# Patient Record
Sex: MALE | Race: WHITE | Employment: OTHER | URBAN - METROPOLITAN AREA
[De-identification: names, ages, dates, MRNs, and addresses within clinical notes are randomized per-mention and may not be internally consistent; named-entity substitution may affect disease eponyms.]

---

## 2017-01-01 ENCOUNTER — OFFICE VISIT (OUTPATIENT)
Dept: HEMATOLOGY/ONCOLOGY | Age: 77
End: 2017-01-01
Attending: INTERNAL MEDICINE
Payer: MEDICARE

## 2017-01-01 ENCOUNTER — APPOINTMENT (OUTPATIENT)
Dept: CT IMAGING | Facility: HOSPITAL | Age: 77
DRG: 478 | End: 2017-01-01
Attending: INTERNAL MEDICINE
Payer: MEDICARE

## 2017-01-01 ENCOUNTER — TELEPHONE (OUTPATIENT)
Dept: HEMATOLOGY/ONCOLOGY | Facility: HOSPITAL | Age: 77
End: 2017-01-01

## 2017-01-01 ENCOUNTER — TELEPHONE (OUTPATIENT)
Dept: FAMILY MEDICINE CLINIC | Facility: CLINIC | Age: 77
End: 2017-01-01

## 2017-01-01 ENCOUNTER — APPOINTMENT (OUTPATIENT)
Dept: HEMATOLOGY/ONCOLOGY | Age: 77
End: 2017-01-01
Attending: INTERNAL MEDICINE
Payer: MEDICARE

## 2017-01-01 ENCOUNTER — HOSPITAL ENCOUNTER (OUTPATIENT)
Dept: RADIATION ONCOLOGY | Age: 77
Discharge: HOME | End: 2017-01-01
Attending: RADIOLOGY
Payer: MEDICARE

## 2017-01-01 ENCOUNTER — LABORATORY ENCOUNTER (OUTPATIENT)
Dept: LAB | Age: 77
End: 2017-01-01
Attending: FAMILY MEDICINE
Payer: MEDICARE

## 2017-01-01 ENCOUNTER — APPOINTMENT (OUTPATIENT)
Dept: GENERAL RADIOLOGY | Facility: HOSPITAL | Age: 77
DRG: 641 | End: 2017-01-01
Attending: EMERGENCY MEDICINE
Payer: MEDICARE

## 2017-01-01 ENCOUNTER — PATIENT OUTREACH (OUTPATIENT)
Dept: CASE MANAGEMENT | Age: 77
End: 2017-01-01

## 2017-01-01 ENCOUNTER — SNF/IP PROF CHARGE ONLY (OUTPATIENT)
Dept: HEMATOLOGY/ONCOLOGY | Facility: HOSPITAL | Age: 77
End: 2017-01-01

## 2017-01-01 ENCOUNTER — TELEPHONE (OUTPATIENT)
Dept: RADIATION ONCOLOGY | Age: 77
End: 2017-01-01

## 2017-01-01 ENCOUNTER — OFFICE VISIT (OUTPATIENT)
Dept: FAMILY MEDICINE CLINIC | Facility: CLINIC | Age: 77
End: 2017-01-01

## 2017-01-01 ENCOUNTER — ANESTHESIA EVENT (OUTPATIENT)
Dept: ENDOSCOPY | Facility: HOSPITAL | Age: 77
End: 2017-01-01

## 2017-01-01 ENCOUNTER — APPOINTMENT (OUTPATIENT)
Dept: LAB | Facility: HOSPITAL | Age: 77
End: 2017-01-01
Attending: INTERNAL MEDICINE
Payer: MEDICARE

## 2017-01-01 ENCOUNTER — ANESTHESIA (OUTPATIENT)
Dept: PERIOP | Facility: HOSPITAL | Age: 77
End: 2017-01-01

## 2017-01-01 ENCOUNTER — HOSPITAL ENCOUNTER (OUTPATIENT)
Dept: MRI IMAGING | Facility: HOSPITAL | Age: 77
Discharge: HOME | End: 2017-01-01
Attending: INTERNAL MEDICINE
Payer: MEDICARE

## 2017-01-01 ENCOUNTER — HOSPITAL ENCOUNTER (OUTPATIENT)
Dept: NUCLEAR MEDICINE | Facility: HOSPITAL | Age: 77
Discharge: HOME | End: 2017-01-01
Attending: INTERNAL MEDICINE
Payer: MEDICARE

## 2017-01-01 ENCOUNTER — HOSPITAL ENCOUNTER (INPATIENT)
Facility: HOSPITAL | Age: 77
LOS: 3 days | Discharge: HOME | DRG: 641 | End: 2017-01-01
Attending: EMERGENCY MEDICINE | Admitting: INTERNAL MEDICINE
Payer: MEDICARE

## 2017-01-01 ENCOUNTER — NURSE ONLY (OUTPATIENT)
Dept: HEMATOLOGY/ONCOLOGY | Age: 77
End: 2017-01-01
Attending: INTERNAL MEDICINE
Payer: MEDICARE

## 2017-01-01 ENCOUNTER — APPOINTMENT (OUTPATIENT)
Dept: RADIATION ONCOLOGY | Facility: HOSPITAL | Age: 77
End: 2017-01-01
Attending: RADIOLOGY
Payer: MEDICARE

## 2017-01-01 ENCOUNTER — NURSE ONLY (OUTPATIENT)
Dept: RADIATION ONCOLOGY | Facility: HOSPITAL | Age: 77
End: 2017-01-01

## 2017-01-01 ENCOUNTER — SOCIAL WORK SERVICES (OUTPATIENT)
Dept: HEMATOLOGY/ONCOLOGY | Facility: HOSPITAL | Age: 77
End: 2017-01-01

## 2017-01-01 ENCOUNTER — APPOINTMENT (OUTPATIENT)
Dept: LAB | Age: 77
End: 2017-01-01
Attending: NURSE PRACTITIONER
Payer: MEDICARE

## 2017-01-01 ENCOUNTER — SURGERY (OUTPATIENT)
Age: 77
End: 2017-01-01

## 2017-01-01 ENCOUNTER — MEDICAL CORRESPONDENCE (OUTPATIENT)
Dept: RADIATION ONCOLOGY | Age: 77
End: 2017-01-01

## 2017-01-01 ENCOUNTER — APPOINTMENT (OUTPATIENT)
Dept: MRI IMAGING | Facility: HOSPITAL | Age: 77
DRG: 478 | End: 2017-01-01
Attending: INTERNAL MEDICINE
Payer: MEDICARE

## 2017-01-01 ENCOUNTER — HOSPITAL ENCOUNTER (EMERGENCY)
Facility: HOSPITAL | Age: 77
Discharge: HOME | End: 2017-01-01
Attending: EMERGENCY MEDICINE
Payer: MEDICARE

## 2017-01-01 ENCOUNTER — ANESTHESIA EVENT (OUTPATIENT)
Dept: PERIOP | Facility: HOSPITAL | Age: 77
End: 2017-01-01

## 2017-01-01 ENCOUNTER — APPOINTMENT (OUTPATIENT)
Dept: CT IMAGING | Facility: HOSPITAL | Age: 77
DRG: 478 | End: 2017-01-01
Attending: EMERGENCY MEDICINE
Payer: MEDICARE

## 2017-01-01 ENCOUNTER — APPOINTMENT (OUTPATIENT)
Dept: MRI IMAGING | Facility: HOSPITAL | Age: 77
End: 2017-01-01
Attending: INTERNAL MEDICINE
Payer: MEDICARE

## 2017-01-01 ENCOUNTER — APPOINTMENT (OUTPATIENT)
Dept: HEMATOLOGY/ONCOLOGY | Age: 77
End: 2017-01-01
Attending: NURSE PRACTITIONER
Payer: MEDICARE

## 2017-01-01 ENCOUNTER — APPOINTMENT (OUTPATIENT)
Dept: HEMATOLOGY/ONCOLOGY | Age: 77
DRG: 054 | End: 2017-01-01
Attending: INTERNAL MEDICINE
Payer: MEDICARE

## 2017-01-01 ENCOUNTER — HOSPITAL ENCOUNTER (INPATIENT)
Facility: HOSPITAL | Age: 77
LOS: 3 days | Discharge: HOME | DRG: 054 | End: 2017-01-01
Attending: HOSPITALIST | Admitting: HOSPITALIST
Payer: MEDICARE

## 2017-01-01 ENCOUNTER — DIETICIAN VISIT (OUTPATIENT)
Dept: HEMATOLOGY/ONCOLOGY | Facility: HOSPITAL | Age: 77
End: 2017-01-01

## 2017-01-01 ENCOUNTER — HOSPITAL ENCOUNTER (INPATIENT)
Dept: RADIATION ONCOLOGY | Facility: HOSPITAL | Age: 77
Discharge: HOME | DRG: 054 | End: 2017-01-01
Attending: RADIOLOGY | Admitting: HOSPITALIST
Payer: MEDICARE

## 2017-01-01 ENCOUNTER — HOSPITAL ENCOUNTER (INPATIENT)
Facility: HOSPITAL | Age: 77
LOS: 2 days | Discharge: HOME | DRG: 478 | End: 2017-01-01
Attending: EMERGENCY MEDICINE | Admitting: INTERNAL MEDICINE
Payer: MEDICARE

## 2017-01-01 ENCOUNTER — OFFICE VISIT (OUTPATIENT)
Dept: HEMATOLOGY/ONCOLOGY | Facility: HOSPITAL | Age: 77
DRG: 054 | End: 2017-01-01
Attending: INTERNAL MEDICINE
Payer: MEDICARE

## 2017-01-01 ENCOUNTER — ANESTHESIA (OUTPATIENT)
Dept: PERIOP | Facility: HOSPITAL | Age: 77
DRG: 054 | End: 2017-01-01
Payer: MEDICARE

## 2017-01-01 ENCOUNTER — ANESTHESIA (OUTPATIENT)
Dept: ENDOSCOPY | Facility: HOSPITAL | Age: 77
End: 2017-01-01

## 2017-01-01 ENCOUNTER — ANESTHESIA EVENT (OUTPATIENT)
Dept: PERIOP | Facility: HOSPITAL | Age: 77
DRG: 054 | End: 2017-01-01
Payer: MEDICARE

## 2017-01-01 ENCOUNTER — APPOINTMENT (OUTPATIENT)
Dept: HEMATOLOGY/ONCOLOGY | Facility: HOSPITAL | Age: 77
End: 2017-01-01
Attending: INTERNAL MEDICINE
Payer: MEDICARE

## 2017-01-01 ENCOUNTER — LAB ENCOUNTER (OUTPATIENT)
Dept: LAB | Age: 77
End: 2017-01-01
Attending: FAMILY MEDICINE
Payer: MEDICARE

## 2017-01-01 ENCOUNTER — LAB ENCOUNTER (OUTPATIENT)
Dept: LAB | Age: 77
DRG: 641 | End: 2017-01-01
Attending: FAMILY MEDICINE
Payer: MEDICARE

## 2017-01-01 ENCOUNTER — APPOINTMENT (OUTPATIENT)
Dept: MRI IMAGING | Facility: HOSPITAL | Age: 77
DRG: 054 | End: 2017-01-01
Attending: INTERNAL MEDICINE
Payer: MEDICARE

## 2017-01-01 ENCOUNTER — APPOINTMENT (OUTPATIENT)
Dept: CT IMAGING | Facility: HOSPITAL | Age: 77
DRG: 054 | End: 2017-01-01
Attending: HOSPITALIST
Payer: MEDICARE

## 2017-01-01 VITALS
BODY MASS INDEX: 30 KG/M2 | DIASTOLIC BLOOD PRESSURE: 73 MMHG | SYSTOLIC BLOOD PRESSURE: 153 MMHG | HEART RATE: 63 BPM | TEMPERATURE: 98 F | RESPIRATION RATE: 18 BRPM | RESPIRATION RATE: 20 BRPM | HEIGHT: 68.5 IN | WEIGHT: 198.63 LBS | WEIGHT: 197.31 LBS | BODY MASS INDEX: 29.76 KG/M2 | OXYGEN SATURATION: 97 % | DIASTOLIC BLOOD PRESSURE: 43 MMHG | TEMPERATURE: 98 F | SYSTOLIC BLOOD PRESSURE: 120 MMHG | HEART RATE: 60 BPM | OXYGEN SATURATION: 96 %

## 2017-01-01 VITALS
HEART RATE: 69 BPM | WEIGHT: 203.25 LBS | HEIGHT: 68 IN | DIASTOLIC BLOOD PRESSURE: 64 MMHG | OXYGEN SATURATION: 94 % | TEMPERATURE: 99 F | SYSTOLIC BLOOD PRESSURE: 110 MMHG | BODY MASS INDEX: 30.8 KG/M2

## 2017-01-01 VITALS
HEART RATE: 69 BPM | RESPIRATION RATE: 20 BRPM | BODY MASS INDEX: 30 KG/M2 | TEMPERATURE: 98 F | OXYGEN SATURATION: 98 % | WEIGHT: 198.38 LBS | SYSTOLIC BLOOD PRESSURE: 139 MMHG | DIASTOLIC BLOOD PRESSURE: 66 MMHG

## 2017-01-01 VITALS
DIASTOLIC BLOOD PRESSURE: 69 MMHG | TEMPERATURE: 98 F | HEART RATE: 68 BPM | OXYGEN SATURATION: 98 % | RESPIRATION RATE: 18 BRPM | SYSTOLIC BLOOD PRESSURE: 127 MMHG

## 2017-01-01 VITALS
HEART RATE: 79 BPM | OXYGEN SATURATION: 98 % | BODY MASS INDEX: 28.64 KG/M2 | SYSTOLIC BLOOD PRESSURE: 120 MMHG | DIASTOLIC BLOOD PRESSURE: 68 MMHG | RESPIRATION RATE: 18 BRPM | HEIGHT: 68.5 IN | TEMPERATURE: 96 F | WEIGHT: 191.19 LBS

## 2017-01-01 VITALS
DIASTOLIC BLOOD PRESSURE: 72 MMHG | HEIGHT: 68 IN | OXYGEN SATURATION: 96 % | WEIGHT: 198.81 LBS | TEMPERATURE: 98 F | RESPIRATION RATE: 20 BRPM | BODY MASS INDEX: 30.13 KG/M2 | SYSTOLIC BLOOD PRESSURE: 180 MMHG | HEART RATE: 61 BPM

## 2017-01-01 VITALS
HEART RATE: 58 BPM | RESPIRATION RATE: 18 BRPM | TEMPERATURE: 97 F | BODY MASS INDEX: 28 KG/M2 | WEIGHT: 183.69 LBS | OXYGEN SATURATION: 99 % | SYSTOLIC BLOOD PRESSURE: 142 MMHG | DIASTOLIC BLOOD PRESSURE: 64 MMHG

## 2017-01-01 VITALS
BODY MASS INDEX: 28 KG/M2 | SYSTOLIC BLOOD PRESSURE: 149 MMHG | HEART RATE: 58 BPM | DIASTOLIC BLOOD PRESSURE: 74 MMHG | TEMPERATURE: 98 F | RESPIRATION RATE: 20 BRPM | OXYGEN SATURATION: 98 % | WEIGHT: 184.63 LBS

## 2017-01-01 VITALS
DIASTOLIC BLOOD PRESSURE: 64 MMHG | SYSTOLIC BLOOD PRESSURE: 130 MMHG | HEIGHT: 68.5 IN | BODY MASS INDEX: 30.28 KG/M2 | WEIGHT: 202.13 LBS | TEMPERATURE: 98 F | HEART RATE: 91 BPM | OXYGEN SATURATION: 97 %

## 2017-01-01 VITALS
OXYGEN SATURATION: 98 % | SYSTOLIC BLOOD PRESSURE: 146 MMHG | HEART RATE: 90 BPM | WEIGHT: 171 LBS | BODY MASS INDEX: 26 KG/M2 | DIASTOLIC BLOOD PRESSURE: 87 MMHG | RESPIRATION RATE: 18 BRPM | TEMPERATURE: 97 F

## 2017-01-01 VITALS
RESPIRATION RATE: 20 BRPM | HEART RATE: 72 BPM | DIASTOLIC BLOOD PRESSURE: 64 MMHG | WEIGHT: 184.38 LBS | RESPIRATION RATE: 18 BRPM | OXYGEN SATURATION: 98 % | HEIGHT: 69 IN | OXYGEN SATURATION: 97 % | BODY MASS INDEX: 27.45 KG/M2 | BODY MASS INDEX: 28 KG/M2 | WEIGHT: 185.31 LBS | SYSTOLIC BLOOD PRESSURE: 139 MMHG | DIASTOLIC BLOOD PRESSURE: 74 MMHG | TEMPERATURE: 98 F | TEMPERATURE: 98 F | HEART RATE: 65 BPM | SYSTOLIC BLOOD PRESSURE: 155 MMHG

## 2017-01-01 VITALS
RESPIRATION RATE: 16 BRPM | BODY MASS INDEX: 29.12 KG/M2 | OXYGEN SATURATION: 98 % | SYSTOLIC BLOOD PRESSURE: 110 MMHG | WEIGHT: 195.25 LBS | RESPIRATION RATE: 18 BRPM | DIASTOLIC BLOOD PRESSURE: 72 MMHG | HEIGHT: 68.5 IN | SYSTOLIC BLOOD PRESSURE: 150 MMHG | HEART RATE: 72 BPM | DIASTOLIC BLOOD PRESSURE: 60 MMHG | WEIGHT: 194.38 LBS | BODY MASS INDEX: 30 KG/M2 | TEMPERATURE: 97 F | HEART RATE: 68 BPM | TEMPERATURE: 99 F

## 2017-01-01 VITALS
BODY MASS INDEX: 28 KG/M2 | WEIGHT: 182.63 LBS | TEMPERATURE: 97 F | HEART RATE: 57 BPM | DIASTOLIC BLOOD PRESSURE: 67 MMHG | SYSTOLIC BLOOD PRESSURE: 165 MMHG | RESPIRATION RATE: 18 BRPM | OXYGEN SATURATION: 98 %

## 2017-01-01 VITALS
OXYGEN SATURATION: 98 % | RESPIRATION RATE: 18 BRPM | SYSTOLIC BLOOD PRESSURE: 124 MMHG | BODY MASS INDEX: 30 KG/M2 | WEIGHT: 202 LBS | DIASTOLIC BLOOD PRESSURE: 67 MMHG | TEMPERATURE: 97 F | HEART RATE: 73 BPM

## 2017-01-01 VITALS
TEMPERATURE: 98 F | WEIGHT: 199 LBS | RESPIRATION RATE: 18 BRPM | BODY MASS INDEX: 29.81 KG/M2 | SYSTOLIC BLOOD PRESSURE: 115 MMHG | HEIGHT: 68.5 IN | HEART RATE: 78 BPM | OXYGEN SATURATION: 98 % | DIASTOLIC BLOOD PRESSURE: 65 MMHG

## 2017-01-01 VITALS
OXYGEN SATURATION: 98 % | HEART RATE: 76 BPM | SYSTOLIC BLOOD PRESSURE: 141 MMHG | DIASTOLIC BLOOD PRESSURE: 65 MMHG | BODY MASS INDEX: 29.99 KG/M2 | WEIGHT: 200.19 LBS | TEMPERATURE: 98 F | HEIGHT: 68.5 IN

## 2017-01-01 VITALS
DIASTOLIC BLOOD PRESSURE: 81 MMHG | TEMPERATURE: 98 F | OXYGEN SATURATION: 97 % | RESPIRATION RATE: 20 BRPM | HEART RATE: 72 BPM | SYSTOLIC BLOOD PRESSURE: 129 MMHG

## 2017-01-01 VITALS
HEART RATE: 82 BPM | TEMPERATURE: 98 F | DIASTOLIC BLOOD PRESSURE: 81 MMHG | RESPIRATION RATE: 18 BRPM | SYSTOLIC BLOOD PRESSURE: 160 MMHG | OXYGEN SATURATION: 98 %

## 2017-01-01 VITALS
WEIGHT: 203 LBS | TEMPERATURE: 98 F | SYSTOLIC BLOOD PRESSURE: 123 MMHG | OXYGEN SATURATION: 97 % | DIASTOLIC BLOOD PRESSURE: 63 MMHG | BODY MASS INDEX: 30 KG/M2 | HEART RATE: 70 BPM | RESPIRATION RATE: 16 BRPM

## 2017-01-01 VITALS
OXYGEN SATURATION: 97 % | DIASTOLIC BLOOD PRESSURE: 85 MMHG | TEMPERATURE: 97 F | HEART RATE: 88 BPM | RESPIRATION RATE: 20 BRPM | SYSTOLIC BLOOD PRESSURE: 136 MMHG

## 2017-01-01 VITALS
BODY MASS INDEX: 26 KG/M2 | TEMPERATURE: 98 F | HEART RATE: 71 BPM | SYSTOLIC BLOOD PRESSURE: 141 MMHG | DIASTOLIC BLOOD PRESSURE: 70 MMHG | OXYGEN SATURATION: 100 % | WEIGHT: 178 LBS | RESPIRATION RATE: 18 BRPM

## 2017-01-01 VITALS
SYSTOLIC BLOOD PRESSURE: 128 MMHG | TEMPERATURE: 97 F | HEART RATE: 69 BPM | OXYGEN SATURATION: 94 % | WEIGHT: 180.81 LBS | BODY MASS INDEX: 28 KG/M2 | RESPIRATION RATE: 18 BRPM | DIASTOLIC BLOOD PRESSURE: 69 MMHG

## 2017-01-01 VITALS
RESPIRATION RATE: 18 BRPM | TEMPERATURE: 98 F | WEIGHT: 195.81 LBS | HEART RATE: 61 BPM | HEIGHT: 69 IN | BODY MASS INDEX: 29 KG/M2 | OXYGEN SATURATION: 96 % | SYSTOLIC BLOOD PRESSURE: 144 MMHG | DIASTOLIC BLOOD PRESSURE: 53 MMHG

## 2017-01-01 VITALS
SYSTOLIC BLOOD PRESSURE: 163 MMHG | RESPIRATION RATE: 18 BRPM | OXYGEN SATURATION: 100 % | TEMPERATURE: 99 F | DIASTOLIC BLOOD PRESSURE: 75 MMHG | HEART RATE: 57 BPM | BODY MASS INDEX: 30 KG/M2 | WEIGHT: 199.81 LBS

## 2017-01-01 VITALS
RESPIRATION RATE: 18 BRPM | TEMPERATURE: 98 F | HEART RATE: 117 BPM | BODY MASS INDEX: 30 KG/M2 | WEIGHT: 199.38 LBS | DIASTOLIC BLOOD PRESSURE: 74 MMHG | SYSTOLIC BLOOD PRESSURE: 128 MMHG | OXYGEN SATURATION: 97 %

## 2017-01-01 VITALS
HEART RATE: 60 BPM | HEIGHT: 68.5 IN | BODY MASS INDEX: 30.02 KG/M2 | DIASTOLIC BLOOD PRESSURE: 72 MMHG | SYSTOLIC BLOOD PRESSURE: 128 MMHG | WEIGHT: 200.38 LBS | TEMPERATURE: 98 F | OXYGEN SATURATION: 97 % | RESPIRATION RATE: 16 BRPM

## 2017-01-01 VITALS
SYSTOLIC BLOOD PRESSURE: 148 MMHG | BODY MASS INDEX: 29 KG/M2 | DIASTOLIC BLOOD PRESSURE: 73 MMHG | OXYGEN SATURATION: 97 % | RESPIRATION RATE: 18 BRPM | HEART RATE: 71 BPM | TEMPERATURE: 98 F | WEIGHT: 194 LBS

## 2017-01-01 VITALS
SYSTOLIC BLOOD PRESSURE: 147 MMHG | DIASTOLIC BLOOD PRESSURE: 69 MMHG | OXYGEN SATURATION: 99 % | HEART RATE: 77 BPM | RESPIRATION RATE: 16 BRPM | TEMPERATURE: 98 F

## 2017-01-01 VITALS
SYSTOLIC BLOOD PRESSURE: 158 MMHG | DIASTOLIC BLOOD PRESSURE: 77 MMHG | WEIGHT: 186.5 LBS | HEART RATE: 54 BPM | BODY MASS INDEX: 28 KG/M2

## 2017-01-01 VITALS
OXYGEN SATURATION: 97 % | BODY MASS INDEX: 30.77 KG/M2 | WEIGHT: 205.38 LBS | RESPIRATION RATE: 18 BRPM | HEART RATE: 67 BPM | TEMPERATURE: 98 F | SYSTOLIC BLOOD PRESSURE: 144 MMHG | DIASTOLIC BLOOD PRESSURE: 69 MMHG | HEIGHT: 68.5 IN

## 2017-01-01 VITALS
BODY MASS INDEX: 26 KG/M2 | SYSTOLIC BLOOD PRESSURE: 117 MMHG | OXYGEN SATURATION: 98 % | RESPIRATION RATE: 18 BRPM | TEMPERATURE: 98 F | DIASTOLIC BLOOD PRESSURE: 69 MMHG | HEART RATE: 79 BPM | WEIGHT: 178 LBS

## 2017-01-01 VITALS
OXYGEN SATURATION: 99 % | TEMPERATURE: 98 F | SYSTOLIC BLOOD PRESSURE: 170 MMHG | RESPIRATION RATE: 18 BRPM | DIASTOLIC BLOOD PRESSURE: 79 MMHG | HEART RATE: 56 BPM

## 2017-01-01 VITALS
DIASTOLIC BLOOD PRESSURE: 68 MMHG | SYSTOLIC BLOOD PRESSURE: 151 MMHG | WEIGHT: 196 LBS | HEART RATE: 76 BPM | RESPIRATION RATE: 18 BRPM | OXYGEN SATURATION: 98 % | TEMPERATURE: 97 F | BODY MASS INDEX: 29 KG/M2

## 2017-01-01 VITALS
BODY MASS INDEX: 29 KG/M2 | OXYGEN SATURATION: 98 % | DIASTOLIC BLOOD PRESSURE: 83 MMHG | WEIGHT: 190.19 LBS | SYSTOLIC BLOOD PRESSURE: 155 MMHG | TEMPERATURE: 98 F | RESPIRATION RATE: 20 BRPM | HEART RATE: 61 BPM

## 2017-01-01 VITALS
OXYGEN SATURATION: 94 % | SYSTOLIC BLOOD PRESSURE: 132 MMHG | BODY MASS INDEX: 30.16 KG/M2 | HEART RATE: 70 BPM | HEIGHT: 68 IN | WEIGHT: 199 LBS | DIASTOLIC BLOOD PRESSURE: 62 MMHG | TEMPERATURE: 98 F

## 2017-01-01 VITALS
BODY MASS INDEX: 29.62 KG/M2 | SYSTOLIC BLOOD PRESSURE: 142 MMHG | RESPIRATION RATE: 16 BRPM | DIASTOLIC BLOOD PRESSURE: 71 MMHG | OXYGEN SATURATION: 96 % | TEMPERATURE: 97 F | HEIGHT: 69 IN | HEART RATE: 76 BPM | WEIGHT: 200 LBS

## 2017-01-01 VITALS
WEIGHT: 202 LBS | DIASTOLIC BLOOD PRESSURE: 70 MMHG | OXYGEN SATURATION: 98 % | TEMPERATURE: 100 F | HEART RATE: 54 BPM | BODY MASS INDEX: 30 KG/M2 | SYSTOLIC BLOOD PRESSURE: 120 MMHG

## 2017-01-01 VITALS
HEIGHT: 68.5 IN | RESPIRATION RATE: 18 BRPM | OXYGEN SATURATION: 92 % | DIASTOLIC BLOOD PRESSURE: 71 MMHG | HEART RATE: 92 BPM | WEIGHT: 190.81 LBS | SYSTOLIC BLOOD PRESSURE: 124 MMHG | BODY MASS INDEX: 28.59 KG/M2 | TEMPERATURE: 99 F

## 2017-01-01 VITALS
TEMPERATURE: 98 F | RESPIRATION RATE: 16 BRPM | DIASTOLIC BLOOD PRESSURE: 81 MMHG | OXYGEN SATURATION: 96 % | HEART RATE: 66 BPM | SYSTOLIC BLOOD PRESSURE: 150 MMHG

## 2017-01-01 VITALS
TEMPERATURE: 97 F | HEART RATE: 89 BPM | WEIGHT: 199.63 LBS | OXYGEN SATURATION: 98 % | RESPIRATION RATE: 18 BRPM | BODY MASS INDEX: 29 KG/M2 | DIASTOLIC BLOOD PRESSURE: 74 MMHG | SYSTOLIC BLOOD PRESSURE: 131 MMHG

## 2017-01-01 VITALS
RESPIRATION RATE: 18 BRPM | OXYGEN SATURATION: 99 % | BODY MASS INDEX: 29 KG/M2 | TEMPERATURE: 96 F | DIASTOLIC BLOOD PRESSURE: 66 MMHG | HEART RATE: 75 BPM | WEIGHT: 191.5 LBS | SYSTOLIC BLOOD PRESSURE: 119 MMHG

## 2017-01-01 VITALS
DIASTOLIC BLOOD PRESSURE: 68 MMHG | BODY MASS INDEX: 29 KG/M2 | TEMPERATURE: 98 F | SYSTOLIC BLOOD PRESSURE: 121 MMHG | WEIGHT: 196.19 LBS | HEART RATE: 70 BPM | RESPIRATION RATE: 24 BRPM | OXYGEN SATURATION: 97 %

## 2017-01-01 VITALS
DIASTOLIC BLOOD PRESSURE: 57 MMHG | HEART RATE: 74 BPM | RESPIRATION RATE: 20 BRPM | OXYGEN SATURATION: 99 % | BODY MASS INDEX: 30 KG/M2 | SYSTOLIC BLOOD PRESSURE: 134 MMHG | TEMPERATURE: 98 F | WEIGHT: 201.63 LBS

## 2017-01-01 VITALS
SYSTOLIC BLOOD PRESSURE: 150 MMHG | BODY MASS INDEX: 26.22 KG/M2 | HEIGHT: 68.5 IN | HEART RATE: 85 BPM | WEIGHT: 175 LBS | TEMPERATURE: 98 F | OXYGEN SATURATION: 99 % | DIASTOLIC BLOOD PRESSURE: 80 MMHG

## 2017-01-01 VITALS
RESPIRATION RATE: 16 BRPM | BODY MASS INDEX: 30 KG/M2 | DIASTOLIC BLOOD PRESSURE: 67 MMHG | WEIGHT: 200.81 LBS | SYSTOLIC BLOOD PRESSURE: 120 MMHG | TEMPERATURE: 97 F | HEART RATE: 71 BPM | OXYGEN SATURATION: 96 %

## 2017-01-01 DIAGNOSIS — J43.9 PULMONARY EMPHYSEMA, UNSPECIFIED EMPHYSEMA TYPE (HCC): ICD-10-CM

## 2017-01-01 DIAGNOSIS — C61 PROSTATE CANCER (HCC): ICD-10-CM

## 2017-01-01 DIAGNOSIS — C79.51 PROSTATE CANCER METASTATIC TO BONE (HCC): Primary | ICD-10-CM

## 2017-01-01 DIAGNOSIS — D63.0 ANEMIA COMPLICATING NEOPLASTIC DISEASE: ICD-10-CM

## 2017-01-01 DIAGNOSIS — C7B.8 SECONDARY NEUROENDOCRINE TUMOR OF BONE(209.73): ICD-10-CM

## 2017-01-01 DIAGNOSIS — C79.51 METASTATIC SMALL CELL CARCINOMA INVOLVING BONE WITH UNKNOWN PRIMARY SITE (HCC): Primary | ICD-10-CM

## 2017-01-01 DIAGNOSIS — C79.51 PROSTATE CANCER METASTATIC TO BONE (HCC): ICD-10-CM

## 2017-01-01 DIAGNOSIS — C61 PROSTATE CANCER METASTATIC TO BONE (HCC): ICD-10-CM

## 2017-01-01 DIAGNOSIS — C61 PROSTATE CANCER (HCC): Primary | ICD-10-CM

## 2017-01-01 DIAGNOSIS — C79.31 BRAIN METASTASES (HCC): Primary | ICD-10-CM

## 2017-01-01 DIAGNOSIS — C80.1 METASTATIC SMALL CELL CARCINOMA INVOLVING BONE WITH UNKNOWN PRIMARY SITE (HCC): ICD-10-CM

## 2017-01-01 DIAGNOSIS — C79.51 SECONDARY CANCER OF BONE (HCC): Primary | ICD-10-CM

## 2017-01-01 DIAGNOSIS — C79.51 METASTATIC SMALL CELL CARCINOMA INVOLVING BONE WITH UNKNOWN PRIMARY SITE (HCC): ICD-10-CM

## 2017-01-01 DIAGNOSIS — I10 BENIGN ESSENTIAL HTN: ICD-10-CM

## 2017-01-01 DIAGNOSIS — R10.2 PELVIC PAIN: ICD-10-CM

## 2017-01-01 DIAGNOSIS — I71.4 ABDOMINAL AORTIC ANEURYSM (AAA) WITHOUT RUPTURE (HCC): ICD-10-CM

## 2017-01-01 DIAGNOSIS — C80.1 METASTATIC SMALL CELL CARCINOMA INVOLVING BONE WITH UNKNOWN PRIMARY SITE (HCC): Primary | ICD-10-CM

## 2017-01-01 DIAGNOSIS — C79.31 BRAIN METASTASES (HCC): ICD-10-CM

## 2017-01-01 DIAGNOSIS — C61 PROSTATE CANCER METASTATIC TO BONE (HCC): Primary | ICD-10-CM

## 2017-01-01 DIAGNOSIS — R31.0 GROSS HEMATURIA: ICD-10-CM

## 2017-01-01 DIAGNOSIS — R07.81 RIB PAIN ON LEFT SIDE: Primary | ICD-10-CM

## 2017-01-01 DIAGNOSIS — N30.40 RADIATION CYSTITIS: Primary | ICD-10-CM

## 2017-01-01 DIAGNOSIS — D64.9 ANEMIA: ICD-10-CM

## 2017-01-01 DIAGNOSIS — R41.0 CONFUSION: ICD-10-CM

## 2017-01-01 DIAGNOSIS — E78.5 HYPERLIPIDEMIA, UNSPECIFIED HYPERLIPIDEMIA TYPE: ICD-10-CM

## 2017-01-01 DIAGNOSIS — C79.51 METASTATIC SMALL CELL CARCINOMA TO BONE (HCC): ICD-10-CM

## 2017-01-01 DIAGNOSIS — N30.40 RADIATION CYSTITIS: ICD-10-CM

## 2017-01-01 DIAGNOSIS — Z13.31 DEPRESSION SCREENING: ICD-10-CM

## 2017-01-01 DIAGNOSIS — C7B.8 SECONDARY NEUROENDOCRINE TUMOR OF BONE(209.73): Primary | ICD-10-CM

## 2017-01-01 DIAGNOSIS — Z71.89 OTHER SPECIFIED COUNSELING: Primary | ICD-10-CM

## 2017-01-01 DIAGNOSIS — R30.0 DYSURIA: ICD-10-CM

## 2017-01-01 DIAGNOSIS — R19.7 DIARRHEA, UNSPECIFIED TYPE: Primary | ICD-10-CM

## 2017-01-01 DIAGNOSIS — M89.8X8 MASS OF SPINE: Primary | ICD-10-CM

## 2017-01-01 DIAGNOSIS — K52.9 CHRONIC DIARRHEA: Primary | ICD-10-CM

## 2017-01-01 DIAGNOSIS — M89.8X8 MASS OF SPINE: ICD-10-CM

## 2017-01-01 DIAGNOSIS — R19.7 DIARRHEA, UNSPECIFIED TYPE: ICD-10-CM

## 2017-01-01 DIAGNOSIS — E87.6 HYPOKALEMIA: Primary | ICD-10-CM

## 2017-01-01 DIAGNOSIS — C80.1 MALIGNANT SMALL CELL CANCER (HCC): ICD-10-CM

## 2017-01-01 DIAGNOSIS — Z85.46 H/O PROSTATE CANCER: ICD-10-CM

## 2017-01-01 DIAGNOSIS — C80.1 MALIGNANT SMALL CELL CANCER (HCC): Primary | ICD-10-CM

## 2017-01-01 DIAGNOSIS — M54.14 RADICULAR PAIN OF THORACIC REGION: ICD-10-CM

## 2017-01-01 DIAGNOSIS — C79.51 SECONDARY CANCER OF BONE (HCC): ICD-10-CM

## 2017-01-01 DIAGNOSIS — J98.01 BRONCHOSPASM, ACUTE: ICD-10-CM

## 2017-01-01 DIAGNOSIS — E87.6 HYPOKALEMIA: ICD-10-CM

## 2017-01-01 DIAGNOSIS — Z85.46 HISTORY OF PROSTATE CANCER: ICD-10-CM

## 2017-01-01 DIAGNOSIS — R53.1 WEAKNESS: ICD-10-CM

## 2017-01-01 DIAGNOSIS — R10.2 PELVIC PAIN: Primary | ICD-10-CM

## 2017-01-01 DIAGNOSIS — I25.10 CAD IN NATIVE ARTERY: ICD-10-CM

## 2017-01-01 DIAGNOSIS — M89.8X9 BONE PAIN: ICD-10-CM

## 2017-01-01 DIAGNOSIS — N13.9 OBSTRUCTIVE UROPATHY: Primary | ICD-10-CM

## 2017-01-01 DIAGNOSIS — E86.0 DEHYDRATION: ICD-10-CM

## 2017-01-01 DIAGNOSIS — J40 BRONCHITIS: Primary | ICD-10-CM

## 2017-01-01 DIAGNOSIS — N41.9 PROSTATITIS, UNSPECIFIED PROSTATITIS TYPE: Primary | ICD-10-CM

## 2017-01-01 DIAGNOSIS — R33.8 ACUTE URINARY RETENTION: ICD-10-CM

## 2017-01-01 DIAGNOSIS — R26.81 UNSTEADINESS: Primary | ICD-10-CM

## 2017-01-01 DIAGNOSIS — Z71.9 ENCOUNTER FOR EDUCATION: ICD-10-CM

## 2017-01-01 DIAGNOSIS — B37.0 THRUSH: ICD-10-CM

## 2017-01-01 DIAGNOSIS — R53.1 WEAKNESS: Primary | ICD-10-CM

## 2017-01-01 DIAGNOSIS — D72.829 LEUKOCYTOSIS, UNSPECIFIED TYPE: ICD-10-CM

## 2017-01-01 DIAGNOSIS — N30.91 HEMORRHAGIC CYSTITIS: ICD-10-CM

## 2017-01-01 DIAGNOSIS — R26.81 UNSTEADINESS: ICD-10-CM

## 2017-01-01 DIAGNOSIS — R30.0 DYSURIA: Primary | ICD-10-CM

## 2017-01-01 DIAGNOSIS — Z00.00 ENCOUNTER FOR ANNUAL HEALTH EXAMINATION: Primary | ICD-10-CM

## 2017-01-01 DIAGNOSIS — Z97.8 FOLEY CATHETER IN PLACE: ICD-10-CM

## 2017-01-01 DIAGNOSIS — Z01.810 PRE-OPERATIVE CARDIOVASCULAR EXAMINATION: Primary | ICD-10-CM

## 2017-01-01 DIAGNOSIS — Z01.810 PRE-OPERATIVE CARDIOVASCULAR EXAMINATION: ICD-10-CM

## 2017-01-01 DIAGNOSIS — N99.114 POSTPROCEDURAL MALE URETHRAL STRICTURE: ICD-10-CM

## 2017-01-01 LAB
ALBUMIN SERPL-MCNC: 2.9 G/DL (ref 3.5–4.8)
ALBUMIN SERPL-MCNC: 2.9 G/DL (ref 3.5–4.8)
ALBUMIN SERPL-MCNC: 3.3 G/DL (ref 3.5–4.8)
ALBUMIN SERPL-MCNC: 3.4 G/DL (ref 3.5–4.8)
ALBUMIN SERPL-MCNC: 3.7 G/DL (ref 3.5–4.8)
ALP LIVER SERPL-CCNC: 105 U/L (ref 45–117)
ALP LIVER SERPL-CCNC: 127 U/L (ref 45–117)
ALP LIVER SERPL-CCNC: 159 U/L (ref 45–117)
ALP LIVER SERPL-CCNC: 179 U/L (ref 45–117)
ALP LIVER SERPL-CCNC: 76 U/L (ref 45–117)
ALP LIVER SERPL-CCNC: 86 U/L (ref 45–117)
ALP LIVER SERPL-CCNC: 96 U/L (ref 45–117)
ALT SERPL-CCNC: 15 U/L (ref 17–63)
ALT SERPL-CCNC: 19 U/L (ref 17–63)
ALT SERPL-CCNC: 21 U/L (ref 17–63)
ALT SERPL-CCNC: 27 U/L (ref 17–63)
ALT SERPL-CCNC: 29 U/L (ref 17–63)
ALT SERPL-CCNC: 36 U/L (ref 17–63)
ALT SERPL-CCNC: 43 U/L (ref 17–63)
APPEARANCE: CLEAR
AST SERPL-CCNC: 13 U/L (ref 15–41)
AST SERPL-CCNC: 14 U/L (ref 15–41)
AST SERPL-CCNC: 18 U/L (ref 15–41)
AST SERPL-CCNC: 21 U/L (ref 15–41)
AST SERPL-CCNC: 22 U/L (ref 15–41)
AST SERPL-CCNC: 26 U/L (ref 15–41)
AST SERPL-CCNC: 29 U/L (ref 15–41)
BAND %: 1 %
BAND %: 10 %
BAND %: 4 %
BASOPHIL % MANUAL: 0 %
BASOPHIL ABSOLUTE MANUAL: 0 X10(3) UL (ref 0–0.1)
BASOPHILS # BLD AUTO: 0.02 X10(3) UL (ref 0–0.1)
BASOPHILS # BLD AUTO: 0.04 X10(3) UL (ref 0–0.1)
BASOPHILS # BLD AUTO: 0.05 X10(3) UL (ref 0–0.1)
BASOPHILS # BLD AUTO: 0.05 X10(3) UL (ref 0–0.1)
BASOPHILS NFR BLD AUTO: 0.2 %
BASOPHILS NFR BLD AUTO: 0.2 %
BASOPHILS NFR BLD AUTO: 0.4 %
BASOPHILS NFR BLD AUTO: 0.5 %
BASOPHILS NFR BLD AUTO: 0.5 %
BASOPHILS NFR BLD AUTO: 0.7 %
BILIRUB SERPL-MCNC: 0.2 MG/DL (ref 0.1–2)
BILIRUB SERPL-MCNC: 0.3 MG/DL (ref 0.1–2)
BILIRUB SERPL-MCNC: 0.4 MG/DL (ref 0.1–2)
BILIRUBIN: NEGATIVE
BLOOD TYPE BARCODE: 5100
BUN BLD-MCNC: 10 MG/DL (ref 8–20)
BUN BLD-MCNC: 12 MG/DL (ref 8–20)
BUN BLD-MCNC: 12 MG/DL (ref 8–20)
BUN BLD-MCNC: 14 MG/DL (ref 8–20)
BUN BLD-MCNC: 18 MG/DL (ref 8–20)
CALCIUM BLD-MCNC: 8.4 MG/DL (ref 8.3–10.3)
CALCIUM BLD-MCNC: 8.8 MG/DL (ref 8.3–10.3)
CALCIUM BLD-MCNC: 9 MG/DL (ref 8.3–10.3)
CALCIUM BLD-MCNC: 9 MG/DL (ref 8.3–10.3)
CALCIUM BLD-MCNC: 9.1 MG/DL (ref 8.3–10.3)
CALCIUM BLD-MCNC: 9.2 MG/DL (ref 8.3–10.3)
CALCIUM BLD-MCNC: 9.3 MG/DL (ref 8.3–10.3)
CHLORIDE: 104 MMOL/L (ref 101–111)
CHLORIDE: 105 MMOL/L (ref 101–111)
CHLORIDE: 106 MMOL/L (ref 101–111)
CHLORIDE: 108 MMOL/L (ref 101–111)
CHLORIDE: 108 MMOL/L (ref 101–111)
CHLORIDE: 109 MMOL/L (ref 101–111)
CHLORIDE: 110 MMOL/L (ref 101–111)
CO2: 27 MMOL/L (ref 22–32)
CO2: 27 MMOL/L (ref 22–32)
CO2: 28 MMOL/L (ref 22–32)
CO2: 29 MMOL/L (ref 22–32)
CO2: 29 MMOL/L (ref 22–32)
CREAT BLD-MCNC: 0.76 MG/DL (ref 0.7–1.3)
CREAT BLD-MCNC: 0.78 MG/DL (ref 0.7–1.3)
CREAT BLD-MCNC: 0.86 MG/DL (ref 0.7–1.3)
CREAT BLD-MCNC: 0.87 MG/DL (ref 0.7–1.3)
CREAT BLD-MCNC: 1.38 MG/DL (ref 0.7–1.3)
EOSINOPHIL # BLD AUTO: 0.06 X10(3) UL (ref 0–0.3)
EOSINOPHIL # BLD AUTO: 0.07 X10(3) UL (ref 0–0.3)
EOSINOPHIL # BLD AUTO: 0.08 X10(3) UL (ref 0–0.3)
EOSINOPHIL # BLD AUTO: 0.13 X10(3) UL (ref 0–0.3)
EOSINOPHIL # BLD AUTO: 0.13 X10(3) UL (ref 0–0.3)
EOSINOPHIL # BLD AUTO: 0.14 X10(3) UL (ref 0–0.3)
EOSINOPHIL % MANUAL: 1 %
EOSINOPHIL ABSOLUTE MANUAL: 0.08 X10(3) UL (ref 0–0.3)
EOSINOPHIL ABSOLUTE MANUAL: 0.17 X10(3) UL (ref 0–0.3)
EOSINOPHIL ABSOLUTE MANUAL: 0.22 X10(3) UL (ref 0–0.3)
EOSINOPHIL NFR BLD AUTO: 0.7 %
EOSINOPHIL NFR BLD AUTO: 0.8 %
EOSINOPHIL NFR BLD AUTO: 1.3 %
EOSINOPHIL NFR BLD AUTO: 1.7 %
EOSINOPHIL NFR BLD AUTO: 1.7 %
EOSINOPHIL NFR BLD AUTO: 1.8 %
ERYTHROCYTE [DISTWIDTH] IN BLOOD BY AUTOMATED COUNT: 13.8 % (ref 11.5–16)
ERYTHROCYTE [DISTWIDTH] IN BLOOD BY AUTOMATED COUNT: 15.2 % (ref 11.5–16)
ERYTHROCYTE [DISTWIDTH] IN BLOOD BY AUTOMATED COUNT: 16.9 % (ref 11.5–16)
ERYTHROCYTE [DISTWIDTH] IN BLOOD BY AUTOMATED COUNT: 17.6 % (ref 11.5–16)
ERYTHROCYTE [DISTWIDTH] IN BLOOD BY AUTOMATED COUNT: 17.6 % (ref 11.5–16)
ERYTHROCYTE [DISTWIDTH] IN BLOOD BY AUTOMATED COUNT: 18.6 % (ref 11.5–16)
ERYTHROCYTE [DISTWIDTH] IN BLOOD BY AUTOMATED COUNT: 18.7 % (ref 11.5–16)
ERYTHROCYTE [DISTWIDTH] IN BLOOD BY AUTOMATED COUNT: 19 % (ref 11.5–16)
ERYTHROCYTE [DISTWIDTH] IN BLOOD BY AUTOMATED COUNT: 19.6 % (ref 11.5–16)
GLUCOSE (URINE DIPSTICK): NEGATIVE MG/DL
GLUCOSE BLD-MCNC: 100 MG/DL (ref 65–99)
GLUCOSE BLD-MCNC: 103 MG/DL (ref 70–99)
GLUCOSE BLD-MCNC: 52 MG/DL (ref 70–99)
GLUCOSE BLD-MCNC: 70 MG/DL (ref 70–99)
GLUCOSE BLD-MCNC: 71 MG/DL (ref 70–99)
GLUCOSE BLD-MCNC: 84 MG/DL (ref 70–99)
GLUCOSE BLD-MCNC: 92 MG/DL (ref 70–99)
GLUCOSE BLD-MCNC: 98 MG/DL (ref 70–99)
HCT VFR BLD AUTO: 26.3 % (ref 37–53)
HCT VFR BLD AUTO: 28.7 % (ref 37–53)
HCT VFR BLD AUTO: 29.2 % (ref 37–53)
HCT VFR BLD AUTO: 29.4 % (ref 37–53)
HCT VFR BLD AUTO: 29.9 % (ref 37–53)
HCT VFR BLD AUTO: 30.2 % (ref 37–53)
HCT VFR BLD AUTO: 32.9 % (ref 37–53)
HCT VFR BLD AUTO: 34.4 % (ref 37–53)
HCT VFR BLD AUTO: 42.2 % (ref 37–53)
HGB BLD-MCNC: 10.1 G/DL (ref 13–17)
HGB BLD-MCNC: 10.9 G/DL (ref 13–17)
HGB BLD-MCNC: 13.1 G/DL (ref 13–17)
HGB BLD-MCNC: 7.9 G/DL (ref 13–17)
HGB BLD-MCNC: 8.8 G/DL (ref 13–17)
HGB BLD-MCNC: 9.1 G/DL (ref 13–17)
HGB BLD-MCNC: 9.4 G/DL (ref 13–17)
HGB BLD-MCNC: 9.4 G/DL (ref 13–17)
HGB BLD-MCNC: 9.5 G/DL (ref 13–17)
IMMATURE GRANULOCYTE COUNT: 0.01 X10(3) UL (ref 0–1)
IMMATURE GRANULOCYTE COUNT: 0.05 X10(3) UL (ref 0–1)
IMMATURE GRANULOCYTE COUNT: 0.09 X10(3) UL (ref 0–1)
IMMATURE GRANULOCYTE COUNT: 0.13 X10(3) UL (ref 0–1)
IMMATURE GRANULOCYTE COUNT: 0.13 X10(3) UL (ref 0–1)
IMMATURE GRANULOCYTE COUNT: 0.21 X10(3) UL (ref 0–1)
IMMATURE GRANULOCYTE RATIO %: 0.2 %
IMMATURE GRANULOCYTE RATIO %: 0.7 %
IMMATURE GRANULOCYTE RATIO %: 1.1 %
IMMATURE GRANULOCYTE RATIO %: 1.4 %
IMMATURE GRANULOCYTE RATIO %: 1.8 %
IMMATURE GRANULOCYTE RATIO %: 2.2 %
KETONES (URINE DIPSTICK): NEGATIVE MG/DL
LARGE PLATELETS: PRESENT
LDH: 156 U/L (ref 84–249)
LDH: 157 U/L (ref 84–249)
LDH: 179 U/L (ref 84–249)
LDH: 210 U/L (ref 84–249)
LDH: 232 U/L (ref 84–249)
LEUKOCYTES: NEGATIVE
LYMPHOCYTE % MANUAL: 14 %
LYMPHOCYTE % MANUAL: 5 %
LYMPHOCYTE % MANUAL: 6 %
LYMPHOCYTE ABSOLUTE MANUAL: 1.01 X10(3) UL (ref 0.9–4)
LYMPHOCYTE ABSOLUTE MANUAL: 1.1 X10(3) UL (ref 0.9–4)
LYMPHOCYTE ABSOLUTE MANUAL: 1.12 X10(3) UL (ref 0.9–4)
LYMPHOCYTES # BLD AUTO: 0.35 X10(3) UL (ref 0.9–4)
LYMPHOCYTES # BLD AUTO: 0.37 X10(3) UL (ref 0.9–4)
LYMPHOCYTES # BLD AUTO: 0.5 X10(3) UL (ref 0.9–4)
LYMPHOCYTES # BLD AUTO: 0.55 X10(3) UL (ref 0.9–4)
LYMPHOCYTES # BLD AUTO: 0.62 X10(3) UL (ref 0.9–4)
LYMPHOCYTES # BLD AUTO: 0.68 X10(3) UL (ref 0.9–4)
LYMPHOCYTES NFR BLD AUTO: 3.8 %
LYMPHOCYTES NFR BLD AUTO: 6.6 %
LYMPHOCYTES NFR BLD AUTO: 6.7 %
LYMPHOCYTES NFR BLD AUTO: 6.8 %
LYMPHOCYTES NFR BLD AUTO: 7.1 %
LYMPHOCYTES NFR BLD AUTO: 8.8 %
M PROTEIN MFR SERPL ELPH: 6.5 G/DL (ref 6.1–8.3)
M PROTEIN MFR SERPL ELPH: 6.7 G/DL (ref 6.1–8.3)
M PROTEIN MFR SERPL ELPH: 6.8 G/DL (ref 6.1–8.3)
M PROTEIN MFR SERPL ELPH: 6.8 G/DL (ref 6.1–8.3)
M PROTEIN MFR SERPL ELPH: 7.5 G/DL (ref 6.1–8.3)
MCH RBC QN AUTO: 29.8 PG (ref 27–33.2)
MCH RBC QN AUTO: 30 PG (ref 27–33.2)
MCH RBC QN AUTO: 30.5 PG (ref 27–33.2)
MCH RBC QN AUTO: 31 PG (ref 27–33.2)
MCH RBC QN AUTO: 32.2 PG (ref 27–33.2)
MCH RBC QN AUTO: 32.7 PG (ref 27–33.2)
MCH RBC QN AUTO: 32.8 PG (ref 27–33.2)
MCHC RBC AUTO-ENTMCNC: 30 G/DL (ref 31–37)
MCHC RBC AUTO-ENTMCNC: 30.1 G/DL (ref 31–37)
MCHC RBC AUTO-ENTMCNC: 30.7 G/DL (ref 31–37)
MCHC RBC AUTO-ENTMCNC: 31 G/DL (ref 31–37)
MCHC RBC AUTO-ENTMCNC: 31.4 G/DL (ref 31–37)
MCHC RBC AUTO-ENTMCNC: 31.5 G/DL (ref 31–37)
MCHC RBC AUTO-ENTMCNC: 31.7 G/DL (ref 31–37)
MCHC RBC AUTO-ENTMCNC: 31.7 G/DL (ref 31–37)
MCHC RBC AUTO-ENTMCNC: 32 G/DL (ref 31–37)
MCV RBC AUTO: 100 FL (ref 80–99)
MCV RBC AUTO: 102.4 FL (ref 80–99)
MCV RBC AUTO: 102.4 FL (ref 80–99)
MCV RBC AUTO: 103.2 FL (ref 80–99)
MCV RBC AUTO: 96.4 FL (ref 80–99)
MCV RBC AUTO: 96.6 FL (ref 80–99)
MCV RBC AUTO: 97.1 FL (ref 80–99)
MCV RBC AUTO: 98.7 FL (ref 80–99)
MCV RBC AUTO: 99.7 FL (ref 80–99)
METAMYELOCYTE %: 1 %
METAMYELOCYTE %: 2 %
METAMYELOCYTE %: 2 %
METAMYELOCYTE ABSOLUTE MANUAL: 0.16 X10(3) UL (ref ?–0.01)
METAMYELOCYTE ABSOLUTE MANUAL: 0.17 X10(3) UL (ref ?–0.01)
METAMYELOCYTE ABSOLUTE MANUAL: 0.44 X10(3) UL (ref ?–0.01)
MONOCYTE % MANUAL: 10 %
MONOCYTE % MANUAL: 6 %
MONOCYTE % MANUAL: 8 %
MONOCYTE ABSOLUTE MANUAL: 0.8 X10(3) UL (ref 0.1–0.6)
MONOCYTE ABSOLUTE MANUAL: 1.32 X10(3) UL (ref 0.1–0.6)
MONOCYTE ABSOLUTE MANUAL: 1.34 X10(3) UL (ref 0.1–0.6)
MONOCYTES # BLD AUTO: 0.41 X10(3) UL (ref 0.1–0.6)
MONOCYTES # BLD AUTO: 0.99 X10(3) UL (ref 0.1–0.6)
MONOCYTES # BLD AUTO: 1.05 X10(3) UL (ref 0.1–0.6)
MONOCYTES # BLD AUTO: 1.06 X10(3) UL (ref 0.1–0.6)
MONOCYTES # BLD AUTO: 1.1 X10(3) UL (ref 0.1–0.6)
MONOCYTES # BLD AUTO: 1.38 X10(3) UL (ref 0.1–0.6)
MONOCYTES NFR BLD AUTO: 11.5 %
MONOCYTES NFR BLD AUTO: 12.6 %
MONOCYTES NFR BLD AUTO: 13.3 %
MONOCYTES NFR BLD AUTO: 15 %
MONOCYTES NFR BLD AUTO: 25.3 %
MONOCYTES NFR BLD AUTO: 4.5 %
MULTISTIX LOT#: NORMAL NUMERIC
MYELOCYTE %: 1 %
MYELOCYTE %: 3 %
MYELOCYTE %: 6 %
MYELOCYTE ABSOLUTE MANUAL: 0.17 X10(3) UL (ref ?–0.01)
MYELOCYTE ABSOLUTE MANUAL: 0.24 X10(3) UL (ref ?–0.01)
MYELOCYTE ABSOLUTE MANUAL: 1.32 X10(3) UL (ref ?–0.01)
NEUTROPHIL ABS PRELIM: 12 X10 (3) UL (ref 1.3–6.7)
NEUTROPHIL ABS PRELIM: 15.72 X10 (3) UL (ref 1.3–6.7)
NEUTROPHIL ABS PRELIM: 3.6 X10 (3) UL (ref 1.3–6.7)
NEUTROPHIL ABS PRELIM: 5.06 X10 (3) UL (ref 1.3–6.7)
NEUTROPHIL ABS PRELIM: 5.74 X10 (3) UL (ref 1.3–6.7)
NEUTROPHIL ABS PRELIM: 5.89 X10 (3) UL (ref 1.3–6.7)
NEUTROPHIL ABS PRELIM: 6.5 X10 (3) UL (ref 1.3–6.7)
NEUTROPHIL ABS PRELIM: 7.42 X10 (3) UL (ref 1.3–6.7)
NEUTROPHIL ABS PRELIM: 8.22 X10 (3) UL (ref 1.3–6.7)
NEUTROPHIL ABSOLUTE MANUAL: 13.94 X10(3) UL (ref 1.3–6.7)
NEUTROPHIL ABSOLUTE MANUAL: 17.16 X10(3) UL (ref 1.3–6.7)
NEUTROPHIL ABSOLUTE MANUAL: 5.6 X10(3) UL (ref 1.3–6.7)
NEUTROPHILS # BLD AUTO: 3.6 X10(3) UL (ref 1.3–6.7)
NEUTROPHILS # BLD AUTO: 5.06 X10(3) UL (ref 1.3–6.7)
NEUTROPHILS # BLD AUTO: 5.74 X10(3) UL (ref 1.3–6.7)
NEUTROPHILS # BLD AUTO: 6.5 X10(3) UL (ref 1.3–6.7)
NEUTROPHILS # BLD AUTO: 7.42 X10(3) UL (ref 1.3–6.7)
NEUTROPHILS # BLD AUTO: 8.22 X10(3) UL (ref 1.3–6.7)
NEUTROPHILS % MANUAL: 69 %
NEUTROPHILS % MANUAL: 73 %
NEUTROPHILS % MANUAL: 74 %
NEUTROPHILS NFR BLD AUTO: 66 %
NEUTROPHILS NFR BLD AUTO: 71.9 %
NEUTROPHILS NFR BLD AUTO: 77.1 %
NEUTROPHILS NFR BLD AUTO: 77.8 %
NEUTROPHILS NFR BLD AUTO: 77.9 %
NEUTROPHILS NFR BLD AUTO: 89.4 %
NITRITE, URINE: NEGATIVE
OCCULT BLOOD: NEGATIVE
PH, URINE: 7 (ref 4.5–8)
PLATELET # BLD AUTO: 103 10(3)UL (ref 150–450)
PLATELET # BLD AUTO: 105 10(3)UL (ref 150–450)
PLATELET # BLD AUTO: 125 10(3)UL (ref 150–450)
PLATELET # BLD AUTO: 192 10(3)UL (ref 150–450)
PLATELET # BLD AUTO: 194 10(3)UL (ref 150–450)
PLATELET # BLD AUTO: 205 10(3)UL (ref 150–450)
PLATELET # BLD AUTO: 220 10(3)UL (ref 150–450)
PLATELET # BLD AUTO: 234 10(3)UL (ref 150–450)
PLATELET # BLD AUTO: 241 10(3)UL (ref 150–450)
PLATELET MORPHOLOGY: NORMAL
POTASSIUM SERPL-SCNC: 3.8 MMOL/L (ref 3.6–5.1)
POTASSIUM SERPL-SCNC: 4 MMOL/L (ref 3.6–5.1)
POTASSIUM SERPL-SCNC: 4.2 MMOL/L (ref 3.6–5.1)
POTASSIUM SERPL-SCNC: 4.2 MMOL/L (ref 3.6–5.1)
POTASSIUM SERPL-SCNC: 4.4 MMOL/L (ref 3.6–5.1)
PROMYELOCYTE %: 2 %
PROMYELOCYTE ABSOLUTE MANUAL: 0.44 X10(3) UL (ref ?–0.01)
PROTEIN (URINE DIPSTICK): NEGATIVE MG/DL
PSA SERPL-MCNC: 0.04 NG/ML (ref 0.01–4)
PSA SERPL-MCNC: 0.04 NG/ML (ref 0.01–4)
PSA SERPL-MCNC: 0.05 NG/ML (ref 0.01–4)
PSA SERPL-MCNC: 0.07 NG/ML (ref 0.01–4)
PSA SERPL-MCNC: 0.15 NG/ML (ref 0.01–4)
PSA SERPL-MCNC: 7.04 NG/ML (ref 0.01–4)
RBC # BLD AUTO: 2.63 X10(6)UL (ref 3.8–5.8)
RBC # BLD AUTO: 2.78 X10(6)UL (ref 3.8–5.8)
RBC # BLD AUTO: 2.87 X10(6)UL (ref 3.8–5.8)
RBC # BLD AUTO: 2.93 X10(6)UL (ref 3.8–5.8)
RBC # BLD AUTO: 2.95 X10(6)UL (ref 3.8–5.8)
RBC # BLD AUTO: 3.03 X10(6)UL (ref 3.8–5.8)
RBC # BLD AUTO: 3.39 X10(6)UL (ref 3.8–5.8)
RBC # BLD AUTO: 3.57 X10(6)UL (ref 3.8–5.8)
RBC # BLD AUTO: 4.37 X10(6)UL (ref 3.8–5.8)
RED CELL DISTRIBUTION WIDTH-SD: 48.8 FL (ref 35.1–46.3)
RED CELL DISTRIBUTION WIDTH-SD: 57.2 FL (ref 35.1–46.3)
RED CELL DISTRIBUTION WIDTH-SD: 61.1 FL (ref 35.1–46.3)
RED CELL DISTRIBUTION WIDTH-SD: 62.3 FL (ref 35.1–46.3)
RED CELL DISTRIBUTION WIDTH-SD: 63.4 FL (ref 35.1–46.3)
RED CELL DISTRIBUTION WIDTH-SD: 65.8 FL (ref 35.1–46.3)
RED CELL DISTRIBUTION WIDTH-SD: 68.9 FL (ref 35.1–46.3)
RED CELL DISTRIBUTION WIDTH-SD: 69.4 FL (ref 35.1–46.3)
RED CELL DISTRIBUTION WIDTH-SD: 70.3 FL (ref 35.1–46.3)
SODIUM SERPL-SCNC: 140 MMOL/L (ref 136–144)
SODIUM SERPL-SCNC: 140 MMOL/L (ref 136–144)
SODIUM SERPL-SCNC: 141 MMOL/L (ref 136–144)
SODIUM SERPL-SCNC: 142 MMOL/L (ref 136–144)
SODIUM SERPL-SCNC: 144 MMOL/L (ref 136–144)
SPECIFIC GRAVITY: 1.02 (ref 1–1.03)
TOTAL CELLS COUNTED: 100
TOXIC GRANULATION: PRESENT
URINE-COLOR: YELLOW
UROBILINOGEN,SEMI-QN: 0.2 MG/DL (ref 0–1.9)
WBC # BLD AUTO: 16.8 X10(3) UL (ref 4–13)
WBC # BLD AUTO: 22 X10(3) UL (ref 4–13)
WBC # BLD AUTO: 5.5 X10(3) UL (ref 4–13)
WBC # BLD AUTO: 7.1 X10(3) UL (ref 4–13)
WBC # BLD AUTO: 7.5 X10(3) UL (ref 4–13)
WBC # BLD AUTO: 8 X10(3) UL (ref 4–13)
WBC # BLD AUTO: 8.4 X10(3) UL (ref 4–13)
WBC # BLD AUTO: 9.2 X10(3) UL (ref 4–13)
WBC # BLD AUTO: 9.5 X10(3) UL (ref 4–13)

## 2017-01-01 PROCEDURE — 96417 CHEMO IV INFUS EACH ADDL SEQ: CPT

## 2017-01-01 PROCEDURE — 96413 CHEMO IV INFUSION 1 HR: CPT

## 2017-01-01 PROCEDURE — B030YZZ MAGNETIC RESONANCE IMAGING (MRI) OF BRAIN USING OTHER CONTRAST: ICD-10-PCS | Performed by: RADIOLOGY

## 2017-01-01 PROCEDURE — 99223 1ST HOSP IP/OBS HIGH 75: CPT | Performed by: INTERNAL MEDICINE

## 2017-01-01 PROCEDURE — 36415 COLL VENOUS BLD VENIPUNCTURE: CPT

## 2017-01-01 PROCEDURE — 99214 OFFICE O/P EST MOD 30 MIN: CPT | Performed by: NURSE PRACTITIONER

## 2017-01-01 PROCEDURE — 99283 EMERGENCY DEPT VISIT LOW MDM: CPT

## 2017-01-01 PROCEDURE — 77387 GUIDANCE FOR RADJ TX DLVR: CPT | Performed by: RADIOLOGY

## 2017-01-01 PROCEDURE — 85007 BL SMEAR W/DIFF WBC COUNT: CPT

## 2017-01-01 PROCEDURE — 96361 HYDRATE IV INFUSION ADD-ON: CPT

## 2017-01-01 PROCEDURE — 20225 BONE BIOPSY TROCAR/NDL DEEP: CPT

## 2017-01-01 PROCEDURE — 77295 3-D RADIOTHERAPY PLAN: CPT | Performed by: RADIOLOGY

## 2017-01-01 PROCEDURE — 87086 URINE CULTURE/COLONY COUNT: CPT

## 2017-01-01 PROCEDURE — 77412 RADIATION TX DELIVERY LVL 3: CPT | Performed by: RADIOLOGY

## 2017-01-01 PROCEDURE — 85025 COMPLETE CBC W/AUTO DIFF WBC: CPT

## 2017-01-01 PROCEDURE — 77470 SPECIAL RADIATION TREATMENT: CPT | Performed by: RADIOLOGY

## 2017-01-01 PROCEDURE — 93000 ELECTROCARDIOGRAM COMPLETE: CPT | Performed by: FAMILY MEDICINE

## 2017-01-01 PROCEDURE — 99214 OFFICE O/P EST MOD 30 MIN: CPT | Performed by: INTERNAL MEDICINE

## 2017-01-01 PROCEDURE — 77300 RADIATION THERAPY DOSE PLAN: CPT | Performed by: RADIOLOGY

## 2017-01-01 PROCEDURE — G9678 ONCOLOGY CARE MODEL SERVICE: HCPCS | Performed by: INTERNAL MEDICINE

## 2017-01-01 PROCEDURE — 85027 COMPLETE CBC AUTOMATED: CPT

## 2017-01-01 PROCEDURE — 96375 TX/PRO/DX INJ NEW DRUG ADDON: CPT

## 2017-01-01 PROCEDURE — 96374 THER/PROPH/DIAG INJ IV PUSH: CPT

## 2017-01-01 PROCEDURE — 80048 BASIC METABOLIC PNL TOTAL CA: CPT

## 2017-01-01 PROCEDURE — 77399 UNLISTED PX MED RADJ PHYSICS: CPT | Performed by: RADIOLOGY

## 2017-01-01 PROCEDURE — 99215 OFFICE O/P EST HI 40 MIN: CPT | Performed by: CLINICAL NURSE SPECIALIST

## 2017-01-01 PROCEDURE — 87086 URINE CULTURE/COLONY COUNT: CPT | Performed by: EMERGENCY MEDICINE

## 2017-01-01 PROCEDURE — 83615 LACTATE (LD) (LDH) ENZYME: CPT

## 2017-01-01 PROCEDURE — 81001 URINALYSIS AUTO W/SCOPE: CPT

## 2017-01-01 PROCEDURE — 71020 XR CHEST PA + LAT CHEST (CPT=71020): CPT

## 2017-01-01 PROCEDURE — 99284 EMERGENCY DEPT VISIT MOD MDM: CPT

## 2017-01-01 PROCEDURE — 99232 SBSQ HOSP IP/OBS MODERATE 35: CPT | Performed by: HOSPITALIST

## 2017-01-01 PROCEDURE — 36430 TRANSFUSION BLD/BLD COMPNT: CPT

## 2017-01-01 PROCEDURE — 87086 URINE CULTURE/COLONY COUNT: CPT | Performed by: FAMILY MEDICINE

## 2017-01-01 PROCEDURE — 87493 C DIFF AMPLIFIED PROBE: CPT

## 2017-01-01 PROCEDURE — 80053 COMPREHEN METABOLIC PANEL: CPT

## 2017-01-01 PROCEDURE — 72197 MRI PELVIS W/O & W/DYE: CPT

## 2017-01-01 PROCEDURE — 99239 HOSP IP/OBS DSCHRG MGMT >30: CPT | Performed by: HOSPITALIST

## 2017-01-01 PROCEDURE — 84153 ASSAY OF PSA TOTAL: CPT

## 2017-01-01 PROCEDURE — 99214 OFFICE O/P EST MOD 30 MIN: CPT | Performed by: FAMILY MEDICINE

## 2017-01-01 PROCEDURE — 99239 HOSP IP/OBS DSCHRG MGMT >30: CPT | Performed by: INTERNAL MEDICINE

## 2017-01-01 PROCEDURE — 96402 CHEMO HORMON ANTINEOPL SQ/IM: CPT

## 2017-01-01 PROCEDURE — 77334 RADIATION TREATMENT AID(S): CPT | Performed by: RADIOLOGY

## 2017-01-01 PROCEDURE — 77336 RADIATION PHYSICS CONSULT: CPT | Performed by: RADIOLOGY

## 2017-01-01 PROCEDURE — 99213 OFFICE O/P EST LOW 20 MIN: CPT

## 2017-01-01 PROCEDURE — 99152 MOD SED SAME PHYS/QHP 5/>YRS: CPT

## 2017-01-01 PROCEDURE — 80061 LIPID PANEL: CPT | Performed by: FAMILY MEDICINE

## 2017-01-01 PROCEDURE — 82962 GLUCOSE BLOOD TEST: CPT

## 2017-01-01 PROCEDURE — 96377 APPLICATON ON-BODY INJECTOR: CPT

## 2017-01-01 PROCEDURE — G0439 PPPS, SUBSEQ VISIT: HCPCS | Performed by: FAMILY MEDICINE

## 2017-01-01 PROCEDURE — 99214 OFFICE O/P EST MOD 30 MIN: CPT

## 2017-01-01 PROCEDURE — 86901 BLOOD TYPING SEROLOGIC RH(D): CPT

## 2017-01-01 PROCEDURE — 99213 OFFICE O/P EST LOW 20 MIN: CPT | Performed by: FAMILY MEDICINE

## 2017-01-01 PROCEDURE — 86850 RBC ANTIBODY SCREEN: CPT

## 2017-01-01 PROCEDURE — 77280 THER RAD SIMULAJ FIELD SMPL: CPT | Performed by: RADIOLOGY

## 2017-01-01 PROCEDURE — 86900 BLOOD TYPING SEROLOGIC ABO: CPT

## 2017-01-01 PROCEDURE — 77290 THER RAD SIMULAJ FIELD CPLX: CPT | Performed by: RADIOLOGY

## 2017-01-01 PROCEDURE — G0444 DEPRESSION SCREEN ANNUAL: HCPCS | Performed by: FAMILY MEDICINE

## 2017-01-01 PROCEDURE — 0DBE8ZX EXCISION OF LARGE INTESTINE, VIA NATURAL OR ARTIFICIAL OPENING ENDOSCOPIC, DIAGNOSTIC: ICD-10-PCS | Performed by: INTERNAL MEDICINE

## 2017-01-01 PROCEDURE — 99232 SBSQ HOSP IP/OBS MODERATE 35: CPT | Performed by: INTERNAL MEDICINE

## 2017-01-01 PROCEDURE — 81003 URINALYSIS AUTO W/O SCOPE: CPT | Performed by: FAMILY MEDICINE

## 2017-01-01 PROCEDURE — 80053 COMPREHEN METABOLIC PANEL: CPT | Performed by: NURSE PRACTITIONER

## 2017-01-01 PROCEDURE — 99232 SBSQ HOSP IP/OBS MODERATE 35: CPT | Performed by: SPECIALIST

## 2017-01-01 PROCEDURE — 99153 MOD SED SAME PHYS/QHP EA: CPT

## 2017-01-01 PROCEDURE — 71010 XR CHEST AP PORTABLE  (CPT=71010): CPT | Performed by: EMERGENCY MEDICINE

## 2017-01-01 PROCEDURE — 81001 URINALYSIS AUTO W/SCOPE: CPT | Performed by: EMERGENCY MEDICINE

## 2017-01-01 PROCEDURE — 83735 ASSAY OF MAGNESIUM: CPT

## 2017-01-01 PROCEDURE — 84153 ASSAY OF PSA TOTAL: CPT | Performed by: INTERNAL MEDICINE

## 2017-01-01 PROCEDURE — 78815 PET IMAGE W/CT SKULL-THIGH: CPT | Performed by: INTERNAL MEDICINE

## 2017-01-01 PROCEDURE — 96372 THER/PROPH/DIAG INJ SC/IM: CPT

## 2017-01-01 PROCEDURE — 0DBL8ZX EXCISION OF TRANSVERSE COLON, VIA NATURAL OR ARTIFICIAL OPENING ENDOSCOPIC, DIAGNOSTIC: ICD-10-PCS | Performed by: INTERNAL MEDICINE

## 2017-01-01 PROCEDURE — 84443 ASSAY THYROID STIM HORMONE: CPT | Performed by: NURSE PRACTITIONER

## 2017-01-01 PROCEDURE — 0DBP8ZX EXCISION OF RECTUM, VIA NATURAL OR ARTIFICIAL OPENING ENDOSCOPIC, DIAGNOSTIC: ICD-10-PCS | Performed by: INTERNAL MEDICINE

## 2017-01-01 PROCEDURE — 77012 CT SCAN FOR NEEDLE BIOPSY: CPT

## 2017-01-01 PROCEDURE — 51702 INSERT TEMP BLADDER CATH: CPT

## 2017-01-01 PROCEDURE — 74177 CT ABD & PELVIS W/CONTRAST: CPT

## 2017-01-01 PROCEDURE — 96360 HYDRATION IV INFUSION INIT: CPT

## 2017-01-01 PROCEDURE — 70450 CT HEAD/BRAIN W/O DYE: CPT | Performed by: HOSPITALIST

## 2017-01-01 PROCEDURE — 84100 ASSAY OF PHOSPHORUS: CPT

## 2017-01-01 PROCEDURE — 77332 RADIATION TREATMENT AID(S): CPT | Performed by: RADIOLOGY

## 2017-01-01 PROCEDURE — 0PB43ZX EXCISION OF THORACIC VERTEBRA, PERCUTANEOUS APPROACH, DIAGNOSTIC: ICD-10-PCS | Performed by: RADIOLOGY

## 2017-01-01 PROCEDURE — 0DBN8ZX EXCISION OF SIGMOID COLON, VIA NATURAL OR ARTIFICIAL OPENING ENDOSCOPIC, DIAGNOSTIC: ICD-10-PCS | Performed by: INTERNAL MEDICINE

## 2017-01-01 PROCEDURE — 77331 SPECIAL RADIATION DOSIMETRY: CPT | Performed by: RADIOLOGY

## 2017-01-01 PROCEDURE — 96409 CHEMO IV PUSH SNGL DRUG: CPT

## 2017-01-01 PROCEDURE — 36415 COLL VENOUS BLD VENIPUNCTURE: CPT | Performed by: FAMILY MEDICINE

## 2017-01-01 PROCEDURE — 72158 MRI LUMBAR SPINE W/O & W/DYE: CPT

## 2017-01-01 PROCEDURE — 86920 COMPATIBILITY TEST SPIN: CPT

## 2017-01-01 PROCEDURE — 70553 MRI BRAIN STEM W/O & W/DYE: CPT | Performed by: INTERNAL MEDICINE

## 2017-01-01 PROCEDURE — 85025 COMPLETE CBC W/AUTO DIFF WBC: CPT | Performed by: NURSE PRACTITIONER

## 2017-01-01 RX ORDER — PROCHLORPERAZINE MALEATE 10 MG
10 TABLET ORAL EVERY 6 HOURS PRN
Qty: 30 TABLET | Refills: 3 | Status: SHIPPED | OUTPATIENT
Start: 2017-01-01 | End: 2017-01-01

## 2017-01-01 RX ORDER — NITROGLYCERIN 0.4 MG/1
0.4 TABLET SUBLINGUAL EVERY 5 MIN PRN
Qty: 100 TABLET | Refills: 0 | Status: SHIPPED | OUTPATIENT
Start: 2017-01-01 | End: 2018-01-01

## 2017-01-01 RX ORDER — PROCHLORPERAZINE MALEATE 10 MG
10 TABLET ORAL EVERY 6 HOURS PRN
Status: CANCELLED | OUTPATIENT
Start: 2017-01-01

## 2017-01-01 RX ORDER — HYDROCODONE BITARTRATE AND ACETAMINOPHEN 10; 325 MG/1; MG/1
1-2 TABLET ORAL EVERY 6 HOURS PRN
Qty: 240 TABLET | Refills: 0 | Status: SHIPPED | OUTPATIENT
Start: 2017-01-01

## 2017-01-01 RX ORDER — LORAZEPAM 2 MG/ML
INJECTION INTRAMUSCULAR EVERY 4 HOURS PRN
Status: CANCELLED | OUTPATIENT
Start: 2017-01-01

## 2017-01-01 RX ORDER — RAMIPRIL 5 MG/1
5 CAPSULE ORAL DAILY
Status: DISCONTINUED | OUTPATIENT
Start: 2017-01-01 | End: 2017-01-01

## 2017-01-01 RX ORDER — HYDROMORPHONE HYDROCHLORIDE 2 MG/1
2 TABLET ORAL
Status: DISCONTINUED | OUTPATIENT
Start: 2017-01-01 | End: 2017-01-01

## 2017-01-01 RX ORDER — HYDROMORPHONE HYDROCHLORIDE 1 MG/ML
0.4 INJECTION, SOLUTION INTRAMUSCULAR; INTRAVENOUS; SUBCUTANEOUS EVERY 5 MIN PRN
Status: ACTIVE | OUTPATIENT
Start: 2017-01-01 | End: 2017-01-01

## 2017-01-01 RX ORDER — CHOLESTYRAMINE LIGHT 4 G/5.7G
4 POWDER, FOR SUSPENSION ORAL 3 TIMES DAILY
Status: DISCONTINUED | OUTPATIENT
Start: 2017-01-01 | End: 2017-01-01

## 2017-01-01 RX ORDER — IPRATROPIUM BROMIDE AND ALBUTEROL SULFATE 2.5; .5 MG/3ML; MG/3ML
3 SOLUTION RESPIRATORY (INHALATION) EVERY 4 HOURS PRN
Status: DISCONTINUED | OUTPATIENT
Start: 2017-01-01 | End: 2017-01-01

## 2017-01-01 RX ORDER — BICALUTAMIDE 50 MG/1
50 TABLET, FILM COATED ORAL DAILY
Qty: 30 TABLET | Refills: 0 | Status: SHIPPED | OUTPATIENT
Start: 2017-01-01 | End: 2017-01-01

## 2017-01-01 RX ORDER — ATORVASTATIN CALCIUM 40 MG/1
40 TABLET, FILM COATED ORAL NIGHTLY
Status: DISCONTINUED | OUTPATIENT
Start: 2017-01-01 | End: 2017-01-01

## 2017-01-01 RX ORDER — LORAZEPAM 0.5 MG/1
TABLET ORAL EVERY 4 HOURS PRN
Status: CANCELLED | OUTPATIENT
Start: 2017-01-01

## 2017-01-01 RX ORDER — POLYETHYLENE GLYCOL 3350 17 G/17G
POWDER, FOR SOLUTION ORAL
Refills: 0 | Status: ON HOLD | COMMUNITY
Start: 2017-01-01 | End: 2017-01-01

## 2017-01-01 RX ORDER — METOCLOPRAMIDE HYDROCHLORIDE 5 MG/ML
10 INJECTION INTRAMUSCULAR; INTRAVENOUS EVERY 6 HOURS PRN
Status: CANCELLED | OUTPATIENT
Start: 2017-01-01

## 2017-01-01 RX ORDER — BICALUTAMIDE 50 MG/1
50 TABLET, FILM COATED ORAL 3 TIMES DAILY
Qty: 30 TABLET | Refills: 0 | Status: SHIPPED | OUTPATIENT
Start: 2017-01-01 | End: 2017-01-01

## 2017-01-01 RX ORDER — RAMIPRIL 5 MG/1
5 CAPSULE ORAL DAILY
Qty: 90 CAPSULE | Refills: 0 | Status: SHIPPED | OUTPATIENT
Start: 2017-01-01 | End: 2018-01-01

## 2017-01-01 RX ORDER — LACTULOSE 20 G/30ML
20 SOLUTION ORAL 3 TIMES DAILY
Qty: 2700 ML | Refills: 1 | Status: ON HOLD | OUTPATIENT
Start: 2017-01-01 | End: 2017-01-01

## 2017-01-01 RX ORDER — NEBULIZER ACCESSORIES
KIT MISCELLANEOUS
Qty: 1 PACKAGE | Refills: 1 | Status: SHIPPED | OUTPATIENT
Start: 2017-01-01 | End: 2017-01-01

## 2017-01-01 RX ORDER — METOCLOPRAMIDE HYDROCHLORIDE 5 MG/ML
10 INJECTION INTRAMUSCULAR; INTRAVENOUS AS NEEDED
Status: DISCONTINUED | OUTPATIENT
Start: 2017-01-01 | End: 2017-01-01 | Stop reason: HOSPADM

## 2017-01-01 RX ORDER — METOCLOPRAMIDE HYDROCHLORIDE 5 MG/ML
10 INJECTION INTRAMUSCULAR; INTRAVENOUS AS NEEDED
Status: ACTIVE | OUTPATIENT
Start: 2017-01-01 | End: 2017-01-01

## 2017-01-01 RX ORDER — ACETAMINOPHEN 325 MG/1
650 TABLET ORAL EVERY 6 HOURS PRN
Status: DISCONTINUED | OUTPATIENT
Start: 2017-01-01 | End: 2017-01-01

## 2017-01-01 RX ORDER — ASPIRIN 81 MG/1
81 TABLET, CHEWABLE ORAL DAILY
Status: DISCONTINUED | OUTPATIENT
Start: 2017-01-01 | End: 2017-01-01

## 2017-01-01 RX ORDER — HYDROMORPHONE HYDROCHLORIDE 1 MG/ML
0.2 INJECTION, SOLUTION INTRAMUSCULAR; INTRAVENOUS; SUBCUTANEOUS ONCE
Status: COMPLETED | OUTPATIENT
Start: 2017-01-01 | End: 2017-01-01

## 2017-01-01 RX ORDER — DIPHENHYDRAMINE HYDROCHLORIDE 50 MG/ML
12.5 INJECTION INTRAMUSCULAR; INTRAVENOUS AS NEEDED
Status: ACTIVE | OUTPATIENT
Start: 2017-01-01 | End: 2017-01-01

## 2017-01-01 RX ORDER — NALOXONE HYDROCHLORIDE 0.4 MG/ML
80 INJECTION, SOLUTION INTRAMUSCULAR; INTRAVENOUS; SUBCUTANEOUS AS NEEDED
Status: ACTIVE | OUTPATIENT
Start: 2017-01-01 | End: 2017-01-01

## 2017-01-01 RX ORDER — HYDRALAZINE HYDROCHLORIDE 20 MG/ML
10 INJECTION INTRAMUSCULAR; INTRAVENOUS ONCE
Status: COMPLETED | OUTPATIENT
Start: 2017-01-01 | End: 2017-01-01

## 2017-01-01 RX ORDER — ALFUZOSIN HYDROCHLORIDE 10 MG/1
10 TABLET, EXTENDED RELEASE ORAL
Status: DISCONTINUED | OUTPATIENT
Start: 2017-01-01 | End: 2017-01-01

## 2017-01-01 RX ORDER — DEXAMETHASONE 2 MG/1
2 TABLET ORAL 2 TIMES DAILY WITH MEALS
Status: DISCONTINUED | OUTPATIENT
Start: 2017-01-01 | End: 2017-01-01

## 2017-01-01 RX ORDER — MEPERIDINE HYDROCHLORIDE 25 MG/ML
12.5 INJECTION INTRAMUSCULAR; INTRAVENOUS; SUBCUTANEOUS AS NEEDED
Status: DISCONTINUED | OUTPATIENT
Start: 2017-01-01 | End: 2017-01-01

## 2017-01-01 RX ORDER — SODIUM CHLORIDE 9 MG/ML
INJECTION, SOLUTION INTRAVENOUS CONTINUOUS
Status: DISCONTINUED | OUTPATIENT
Start: 2017-01-01 | End: 2017-01-01

## 2017-01-01 RX ORDER — CARVEDILOL 6.25 MG/1
6.25 TABLET ORAL 2 TIMES DAILY WITH MEALS
Status: DISCONTINUED | OUTPATIENT
Start: 2017-01-01 | End: 2017-01-01

## 2017-01-01 RX ORDER — DEXAMETHASONE 4 MG/1
4 TABLET ORAL 4 TIMES DAILY
Qty: 120 TABLET | Refills: 0 | Status: SHIPPED | OUTPATIENT
Start: 2017-01-01 | End: 2018-01-01 | Stop reason: ALTCHOICE

## 2017-01-01 RX ORDER — ROSUVASTATIN CALCIUM 20 MG/1
20 TABLET, COATED ORAL DAILY
Status: DISCONTINUED | OUTPATIENT
Start: 2017-01-01 | End: 2017-01-01

## 2017-01-01 RX ORDER — MIDAZOLAM HYDROCHLORIDE 1 MG/ML
1 INJECTION INTRAMUSCULAR; INTRAVENOUS EVERY 5 MIN PRN
Status: DISCONTINUED | OUTPATIENT
Start: 2017-01-01 | End: 2017-01-01

## 2017-01-01 RX ORDER — ONDANSETRON 2 MG/ML
4 INJECTION INTRAMUSCULAR; INTRAVENOUS
Status: DISCONTINUED | OUTPATIENT
Start: 2017-01-01 | End: 2017-01-01

## 2017-01-01 RX ORDER — HYDROCODONE BITARTRATE AND ACETAMINOPHEN 5; 325 MG/1; MG/1
1 TABLET ORAL EVERY 4 HOURS PRN
Status: DISCONTINUED | OUTPATIENT
Start: 2017-01-01 | End: 2017-01-01

## 2017-01-01 RX ORDER — HYDROCODONE BITARTRATE AND ACETAMINOPHEN 5; 325 MG/1; MG/1
1 TABLET ORAL AS NEEDED
Status: DISCONTINUED | OUTPATIENT
Start: 2017-01-01 | End: 2017-01-01

## 2017-01-01 RX ORDER — DIPHENHYDRAMINE HCL 25 MG
25 CAPSULE ORAL ONCE
Status: CANCELLED | OUTPATIENT
Start: 2017-01-01

## 2017-01-01 RX ORDER — SULFAMETHOXAZOLE AND TRIMETHOPRIM 800; 160 MG/1; MG/1
TABLET ORAL
COMMUNITY
Start: 2017-01-01 | End: 2017-01-01

## 2017-01-01 RX ORDER — ONDANSETRON 2 MG/ML
8 INJECTION INTRAMUSCULAR; INTRAVENOUS EVERY 6 HOURS PRN
Status: CANCELLED | OUTPATIENT
Start: 2017-01-01

## 2017-01-01 RX ORDER — ALPRAZOLAM 0.25 MG/1
0.25 TABLET ORAL 2 TIMES DAILY
Status: DISCONTINUED | OUTPATIENT
Start: 2017-01-01 | End: 2017-01-01

## 2017-01-01 RX ORDER — MORPHINE SULFATE 4 MG/ML
1 INJECTION, SOLUTION INTRAMUSCULAR; INTRAVENOUS EVERY 4 HOURS PRN
Status: DISCONTINUED | OUTPATIENT
Start: 2017-01-01 | End: 2017-01-01

## 2017-01-01 RX ORDER — LABETALOL HYDROCHLORIDE 5 MG/ML
5 INJECTION, SOLUTION INTRAVENOUS EVERY 5 MIN PRN
Status: ACTIVE | OUTPATIENT
Start: 2017-01-01 | End: 2017-01-01

## 2017-01-01 RX ORDER — ONDANSETRON 2 MG/ML
4 INJECTION INTRAMUSCULAR; INTRAVENOUS AS NEEDED
Status: DISCONTINUED | OUTPATIENT
Start: 2017-01-01 | End: 2017-01-01

## 2017-01-01 RX ORDER — SODIUM CHLORIDE, SODIUM LACTATE, POTASSIUM CHLORIDE, CALCIUM CHLORIDE 600; 310; 30; 20 MG/100ML; MG/100ML; MG/100ML; MG/100ML
INJECTION, SOLUTION INTRAVENOUS CONTINUOUS
Status: DISCONTINUED | OUTPATIENT
Start: 2017-01-01 | End: 2017-01-01

## 2017-01-01 RX ORDER — RANITIDINE 150 MG/1
150 TABLET ORAL 2 TIMES DAILY
COMMUNITY
End: 2017-01-01

## 2017-01-01 RX ORDER — ONDANSETRON 2 MG/ML
4 INJECTION INTRAMUSCULAR; INTRAVENOUS EVERY 6 HOURS PRN
Status: DISCONTINUED | OUTPATIENT
Start: 2017-01-01 | End: 2017-01-01

## 2017-01-01 RX ORDER — ONDANSETRON HYDROCHLORIDE 8 MG/1
8 TABLET, FILM COATED ORAL EVERY 8 HOURS PRN
Qty: 30 TABLET | Refills: 3 | Status: SHIPPED | OUTPATIENT
Start: 2017-01-01 | End: 2018-01-01

## 2017-01-01 RX ORDER — DEXTROSE MONOHYDRATE 50 MG/ML
INJECTION, SOLUTION INTRAVENOUS CONTINUOUS
Status: DISCONTINUED | OUTPATIENT
Start: 2017-01-01 | End: 2017-01-01

## 2017-01-01 RX ORDER — ACETAMINOPHEN 325 MG/1
650 TABLET ORAL ONCE
Status: CANCELLED | OUTPATIENT
Start: 2017-01-01

## 2017-01-01 RX ORDER — FLUMAZENIL 0.1 MG/ML
0.2 INJECTION, SOLUTION INTRAVENOUS AS NEEDED
Status: DISCONTINUED | OUTPATIENT
Start: 2017-01-01 | End: 2017-01-01

## 2017-01-01 RX ORDER — HYDROMORPHONE HYDROCHLORIDE 1 MG/ML
0.2 INJECTION, SOLUTION INTRAMUSCULAR; INTRAVENOUS; SUBCUTANEOUS ONCE
Status: CANCELLED
Start: 2017-01-01 | End: 2017-01-01

## 2017-01-01 RX ORDER — KETOROLAC TROMETHAMINE 30 MG/ML
30 INJECTION, SOLUTION INTRAMUSCULAR; INTRAVENOUS EVERY 6 HOURS PRN
Status: ACTIVE | OUTPATIENT
Start: 2017-01-01 | End: 2017-01-01

## 2017-01-01 RX ORDER — DEXAMETHASONE 4 MG/1
4 TABLET ORAL 2 TIMES DAILY WITH MEALS
Qty: 60 TABLET | Refills: 0 | Status: SHIPPED | OUTPATIENT
Start: 2017-01-01 | End: 2017-01-01 | Stop reason: ALTCHOICE

## 2017-01-01 RX ORDER — HYDROMORPHONE HYDROCHLORIDE 2 MG/1
2 TABLET ORAL
Qty: 60 TABLET | Refills: 0 | Status: SHIPPED | COMMUNITY
Start: 2017-01-01 | End: 2017-01-01

## 2017-01-01 RX ORDER — TAMSULOSIN HYDROCHLORIDE 0.4 MG/1
0.4 CAPSULE ORAL DAILY
COMMUNITY
End: 2018-01-01

## 2017-01-01 RX ORDER — LORAZEPAM 1 MG/1
TABLET ORAL
COMMUNITY
Start: 2017-01-01 | End: 2017-01-01

## 2017-01-01 RX ORDER — PREDNISONE 20 MG/1
20 TABLET ORAL 2 TIMES DAILY
Qty: 14 TABLET | Refills: 0 | Status: SHIPPED | OUTPATIENT
Start: 2017-01-01 | End: 2017-01-01

## 2017-01-01 RX ORDER — AZITHROMYCIN 250 MG/1
TABLET, FILM COATED ORAL
Qty: 6 TABLET | Refills: 0 | Status: SHIPPED | OUTPATIENT
Start: 2017-01-01 | End: 2017-01-01

## 2017-01-01 RX ORDER — SULFAMETHOXAZOLE AND TRIMETHOPRIM 800; 160 MG/1; MG/1
TABLET ORAL
Qty: 14 TABLET | Refills: 0 | Status: ON HOLD | OUTPATIENT
Start: 2017-01-01 | End: 2017-01-01 | Stop reason: ALTCHOICE

## 2017-01-01 RX ORDER — DEXAMETHASONE SODIUM PHOSPHATE 4 MG/ML
10 VIAL (ML) INJECTION EVERY 6 HOURS
Status: DISCONTINUED | OUTPATIENT
Start: 2017-01-01 | End: 2017-01-01

## 2017-01-01 RX ORDER — DIPHENOXYLATE HYDROCHLORIDE AND ATROPINE SULFATE 2.5; .025 MG/1; MG/1
1-2 TABLET ORAL 4 TIMES DAILY PRN
Status: DISCONTINUED | OUTPATIENT
Start: 2017-01-01 | End: 2017-01-01

## 2017-01-01 RX ORDER — PROCHLORPERAZINE MALEATE 10 MG
10 TABLET ORAL EVERY 6 HOURS PRN
Qty: 30 TABLET | Refills: 3 | Status: SHIPPED | OUTPATIENT
Start: 2017-01-01 | End: 2017-01-01 | Stop reason: ALTCHOICE

## 2017-01-01 RX ORDER — HYDROCODONE BITARTRATE AND ACETAMINOPHEN 5; 325 MG/1; MG/1
1-2 TABLET ORAL EVERY 8 HOURS PRN
Qty: 90 TABLET | Refills: 0 | Status: SHIPPED | OUTPATIENT
Start: 2017-01-01 | End: 2017-01-01

## 2017-01-01 RX ORDER — POTASSIUM CHLORIDE 750 MG/1
20 TABLET, EXTENDED RELEASE ORAL ONCE
Status: COMPLETED | OUTPATIENT
Start: 2017-01-01 | End: 2017-01-01

## 2017-01-01 RX ORDER — ERGOCALCIFEROL (VITAMIN D2) 10 MCG
400 TABLET ORAL DAILY
Status: DISCONTINUED | OUTPATIENT
Start: 2017-01-01 | End: 2017-01-01

## 2017-01-01 RX ORDER — KETOCONAZOLE 20 MG/G
1 CREAM TOPICAL 2 TIMES DAILY
Qty: 30 G | Refills: 3 | Status: ON HOLD | OUTPATIENT
Start: 2017-01-01 | End: 2017-01-01 | Stop reason: ALTCHOICE

## 2017-01-01 RX ORDER — HYDROCODONE BITARTRATE AND ACETAMINOPHEN 5; 325 MG/1; MG/1
1-2 TABLET ORAL EVERY 8 HOURS PRN
Qty: 30 TABLET | Refills: 0 | Status: SHIPPED | OUTPATIENT
Start: 2017-01-01 | End: 2017-01-01

## 2017-01-01 RX ORDER — NALOXONE HYDROCHLORIDE 0.4 MG/ML
80 INJECTION, SOLUTION INTRAMUSCULAR; INTRAVENOUS; SUBCUTANEOUS AS NEEDED
Status: DISCONTINUED | OUTPATIENT
Start: 2017-01-01 | End: 2017-01-01 | Stop reason: HOSPADM

## 2017-01-01 RX ORDER — HYDROMORPHONE HYDROCHLORIDE 2 MG/1
2 TABLET ORAL
Qty: 60 TABLET | Refills: 0 | Status: SHIPPED | OUTPATIENT
Start: 2017-01-01 | End: 2017-01-01 | Stop reason: ALTCHOICE

## 2017-01-01 RX ORDER — POLYETHYLENE GLYCOL 3350 17 G/17G
17 POWDER, FOR SOLUTION ORAL DAILY
Status: DISCONTINUED | OUTPATIENT
Start: 2017-01-01 | End: 2017-01-01

## 2017-01-01 RX ORDER — FENTANYL 25 UG/H
1 PATCH TRANSDERMAL
COMMUNITY
Start: 2017-01-01 | End: 2018-01-01 | Stop reason: DRUGHIGH

## 2017-01-01 RX ORDER — DEXAMETHASONE 4 MG/1
4 TABLET ORAL EVERY 8 HOURS SCHEDULED
Status: DISCONTINUED | OUTPATIENT
Start: 2017-01-01 | End: 2017-01-01

## 2017-01-01 RX ORDER — HYDROMORPHONE HYDROCHLORIDE 1 MG/ML
0.5 INJECTION, SOLUTION INTRAMUSCULAR; INTRAVENOUS; SUBCUTANEOUS EVERY 30 MIN PRN
Status: DISCONTINUED | OUTPATIENT
Start: 2017-01-01 | End: 2017-01-01 | Stop reason: SDUPTHER

## 2017-01-01 RX ORDER — DOCUSATE SODIUM 100 MG/1
100 CAPSULE, LIQUID FILLED ORAL DAILY
Status: ON HOLD | COMMUNITY
End: 2017-01-01

## 2017-01-01 RX ORDER — DEXAMETHASONE SODIUM PHOSPHATE 4 MG/ML
4 VIAL (ML) INJECTION AS NEEDED
Status: DISCONTINUED | OUTPATIENT
Start: 2017-01-01 | End: 2017-01-01 | Stop reason: HOSPADM

## 2017-01-01 RX ORDER — HYDROMORPHONE HYDROCHLORIDE 1 MG/ML
0.2 INJECTION, SOLUTION INTRAMUSCULAR; INTRAVENOUS; SUBCUTANEOUS EVERY 2 HOUR PRN
Status: DISCONTINUED | OUTPATIENT
Start: 2017-01-01 | End: 2017-01-01

## 2017-01-01 RX ORDER — HYDROMORPHONE HYDROCHLORIDE 1 MG/ML
0.5 INJECTION, SOLUTION INTRAMUSCULAR; INTRAVENOUS; SUBCUTANEOUS EVERY 30 MIN PRN
Status: DISCONTINUED | OUTPATIENT
Start: 2017-01-01 | End: 2017-01-01

## 2017-01-01 RX ORDER — TAMSULOSIN HYDROCHLORIDE 0.4 MG/1
0.4 CAPSULE ORAL DAILY
COMMUNITY
End: 2017-01-01 | Stop reason: ALTCHOICE

## 2017-01-01 RX ORDER — ALBUTEROL SULFATE 90 UG/1
2 AEROSOL, METERED RESPIRATORY (INHALATION) EVERY 4 HOURS PRN
Status: DISCONTINUED | OUTPATIENT
Start: 2017-01-01 | End: 2017-01-01

## 2017-01-01 RX ORDER — DEXAMETHASONE SODIUM PHOSPHATE 4 MG/ML
4 VIAL (ML) INJECTION EVERY 6 HOURS
Status: DISCONTINUED | OUTPATIENT
Start: 2017-01-01 | End: 2017-01-01

## 2017-01-01 RX ORDER — CIPROFLOXACIN 500 MG/1
500 TABLET, FILM COATED ORAL 2 TIMES DAILY
Qty: 20 TABLET | Refills: 0 | Status: SHIPPED | OUTPATIENT
Start: 2017-01-01 | End: 2017-01-01

## 2017-01-01 RX ORDER — SODIUM CHLORIDE 9 MG/ML
INJECTION, SOLUTION INTRAVENOUS ONCE
Status: COMPLETED | OUTPATIENT
Start: 2017-01-01 | End: 2017-01-01

## 2017-01-01 RX ORDER — ACETAMINOPHEN 325 MG/1
650 TABLET ORAL EVERY 4 HOURS PRN
Status: DISCONTINUED | OUTPATIENT
Start: 2017-01-01 | End: 2017-01-01

## 2017-01-01 RX ORDER — DIPHENHYDRAMINE HCL 50 MG
50 CAPSULE ORAL ONCE
Status: COMPLETED | OUTPATIENT
Start: 2017-01-01 | End: 2017-01-01

## 2017-01-01 RX ORDER — FAMOTIDINE 20 MG/1
20 TABLET ORAL 2 TIMES DAILY
Status: DISCONTINUED | OUTPATIENT
Start: 2017-01-01 | End: 2017-01-01

## 2017-01-01 RX ORDER — CETIRIZINE HYDROCHLORIDE 10 MG/1
10 TABLET ORAL 2 TIMES DAILY
COMMUNITY
End: 2017-01-01

## 2017-01-01 RX ORDER — HYDROCODONE BITARTRATE AND ACETAMINOPHEN 5; 325 MG/1; MG/1
2 TABLET ORAL EVERY 4 HOURS PRN
Status: DISCONTINUED | OUTPATIENT
Start: 2017-01-01 | End: 2017-01-01

## 2017-01-01 RX ORDER — POTASSIUM CHLORIDE 20 MEQ/1
40 TABLET, EXTENDED RELEASE ORAL EVERY 4 HOURS
Status: COMPLETED | OUTPATIENT
Start: 2017-01-01 | End: 2017-01-01

## 2017-01-01 RX ORDER — HYDROMORPHONE HYDROCHLORIDE 1 MG/ML
0.4 INJECTION, SOLUTION INTRAMUSCULAR; INTRAVENOUS; SUBCUTANEOUS EVERY 5 MIN PRN
Status: DISCONTINUED | OUTPATIENT
Start: 2017-01-01 | End: 2017-01-01 | Stop reason: HOSPADM

## 2017-01-01 RX ORDER — CHOLESTYRAMINE 4 G/9G
4 POWDER, FOR SUSPENSION ORAL 3 TIMES DAILY
Qty: 90 EACH | Refills: 0 | Status: ON HOLD | OUTPATIENT
Start: 2017-01-01 | End: 2017-01-01

## 2017-01-01 RX ORDER — IPRATROPIUM BROMIDE AND ALBUTEROL SULFATE 2.5; .5 MG/3ML; MG/3ML
SOLUTION RESPIRATORY (INHALATION)
Qty: 360 ML | Refills: 0 | Status: SHIPPED | OUTPATIENT
Start: 2017-01-01 | End: 2017-01-01

## 2017-01-01 RX ORDER — NALOXONE HYDROCHLORIDE 0.4 MG/ML
80 INJECTION, SOLUTION INTRAMUSCULAR; INTRAVENOUS; SUBCUTANEOUS AS NEEDED
Status: DISCONTINUED | OUTPATIENT
Start: 2017-01-01 | End: 2017-01-01

## 2017-01-01 RX ORDER — MIDAZOLAM HYDROCHLORIDE 1 MG/ML
INJECTION INTRAMUSCULAR; INTRAVENOUS
Status: COMPLETED
Start: 2017-01-01 | End: 2017-01-01

## 2017-01-01 RX ORDER — ONDANSETRON HYDROCHLORIDE 8 MG/1
8 TABLET, FILM COATED ORAL EVERY 8 HOURS PRN
Qty: 30 TABLET | Refills: 3 | Status: SHIPPED | OUTPATIENT
Start: 2017-01-01 | End: 2017-01-01

## 2017-01-01 RX ORDER — TAMSULOSIN HYDROCHLORIDE 0.4 MG/1
0.4 CAPSULE ORAL DAILY
COMMUNITY
End: 2017-01-01

## 2017-01-01 RX ORDER — MIDAZOLAM HYDROCHLORIDE 1 MG/ML
1 INJECTION INTRAMUSCULAR; INTRAVENOUS EVERY 5 MIN PRN
Status: ACTIVE | OUTPATIENT
Start: 2017-01-01 | End: 2017-01-01

## 2017-01-01 RX ORDER — SOFT LENS DISINFECTANT
SOLUTION, NON-ORAL MISCELLANEOUS
Qty: 1 DEVICE | Refills: 0 | Status: SHIPPED | OUTPATIENT
Start: 2017-01-01 | End: 2017-01-01

## 2017-01-01 RX ORDER — ACETAMINOPHEN 500 MG
1000 TABLET ORAL ONCE AS NEEDED
Status: ACTIVE | OUTPATIENT
Start: 2017-01-01 | End: 2017-01-01

## 2017-01-01 RX ORDER — ENOXAPARIN SODIUM 100 MG/ML
40 INJECTION SUBCUTANEOUS DAILY
Status: DISCONTINUED | OUTPATIENT
Start: 2017-01-01 | End: 2017-01-01

## 2017-01-01 RX ORDER — ROSUVASTATIN CALCIUM 20 MG/1
20 TABLET, COATED ORAL DAILY
Qty: 90 TABLET | Refills: 0 | Status: SHIPPED | OUTPATIENT
Start: 2017-01-01 | End: 2018-01-01

## 2017-01-01 RX ORDER — HYDROMORPHONE HYDROCHLORIDE 1 MG/ML
0.4 INJECTION, SOLUTION INTRAMUSCULAR; INTRAVENOUS; SUBCUTANEOUS EVERY 2 HOUR PRN
Status: DISCONTINUED | OUTPATIENT
Start: 2017-01-01 | End: 2017-01-01

## 2017-01-01 RX ORDER — ROSUVASTATIN CALCIUM 20 MG/1
20 TABLET, COATED ORAL DAILY
Qty: 90 TABLET | Refills: 0 | Status: SHIPPED | OUTPATIENT
Start: 2017-01-01 | End: 2017-01-01

## 2017-01-01 RX ORDER — HYDROMORPHONE HYDROCHLORIDE 1 MG/ML
0.8 INJECTION, SOLUTION INTRAMUSCULAR; INTRAVENOUS; SUBCUTANEOUS EVERY 2 HOUR PRN
Status: DISCONTINUED | OUTPATIENT
Start: 2017-01-01 | End: 2017-01-01

## 2017-01-01 RX ORDER — RAMIPRIL 5 MG/1
5 CAPSULE ORAL DAILY
Qty: 90 CAPSULE | Refills: 0 | Status: SHIPPED | OUTPATIENT
Start: 2017-01-01 | End: 2017-01-01

## 2017-01-01 RX ORDER — DIPHENOXYLATE HYDROCHLORIDE AND ATROPINE SULFATE 2.5; .025 MG/1; MG/1
1-2 TABLET ORAL 4 TIMES DAILY PRN
Qty: 60 TABLET | Refills: 1 | Status: SHIPPED
Start: 2017-01-01 | End: 2017-01-01

## 2017-01-01 RX ORDER — ACETAMINOPHEN 325 MG/1
650 TABLET ORAL ONCE
Status: COMPLETED | OUTPATIENT
Start: 2017-01-01 | End: 2017-01-01

## 2017-01-01 RX ORDER — ASPIRIN 325 MG
325 TABLET, DELAYED RELEASE (ENTERIC COATED) ORAL DAILY
Status: DISCONTINUED | OUTPATIENT
Start: 2017-01-01 | End: 2017-01-01

## 2017-01-01 RX ORDER — NITROGLYCERIN 0.4 MG/1
TABLET SUBLINGUAL
Status: COMPLETED
Start: 2017-01-01 | End: 2017-01-01

## 2017-01-01 RX ORDER — DEXAMETHASONE 4 MG/1
4 TABLET ORAL 2 TIMES DAILY WITH MEALS
Status: DISCONTINUED | OUTPATIENT
Start: 2017-01-01 | End: 2017-01-01

## 2017-01-01 RX ORDER — ROSUVASTATIN CALCIUM 20 MG/1
20 TABLET, COATED ORAL DAILY
COMMUNITY
Start: 2017-01-01 | End: 2017-01-01

## 2017-01-01 RX ORDER — CLOPIDOGREL BISULFATE 75 MG/1
75 TABLET ORAL DAILY
Status: DISCONTINUED | OUTPATIENT
Start: 2017-01-01 | End: 2017-01-01

## 2017-01-01 RX ORDER — POTASSIUM CHLORIDE 20 MEQ/1
20 TABLET, EXTENDED RELEASE ORAL 2 TIMES DAILY
Qty: 60 TABLET | Refills: 1 | Status: SHIPPED | OUTPATIENT
Start: 2017-01-01 | End: 2017-01-01

## 2017-01-01 RX ORDER — DEXAMETHASONE 4 MG/1
4 TABLET ORAL 4 TIMES DAILY
Status: DISCONTINUED | OUTPATIENT
Start: 2017-01-01 | End: 2017-01-01

## 2017-01-01 RX ORDER — ONDANSETRON 2 MG/ML
4 INJECTION INTRAMUSCULAR; INTRAVENOUS AS NEEDED
Status: ACTIVE | OUTPATIENT
Start: 2017-01-01 | End: 2017-01-01

## 2017-01-01 RX ORDER — CIPROFLOXACIN 500 MG/1
500 TABLET, FILM COATED ORAL 2 TIMES DAILY
Qty: 14 TABLET | Refills: 0 | Status: SHIPPED | OUTPATIENT
Start: 2017-01-01 | End: 2017-01-01

## 2017-01-01 RX ORDER — DIPHENHYDRAMINE HCL 25 MG
25 CAPSULE ORAL ONCE
Status: COMPLETED | OUTPATIENT
Start: 2017-01-01 | End: 2017-01-01

## 2017-01-01 RX ORDER — CARVEDILOL 6.25 MG/1
6.25 TABLET ORAL 2 TIMES DAILY WITH MEALS
Qty: 180 TABLET | Refills: 0 | Status: SHIPPED | OUTPATIENT
Start: 2017-01-01 | End: 2017-01-01

## 2017-01-01 RX ORDER — HYDROCODONE BITARTRATE AND ACETAMINOPHEN 5; 325 MG/1; MG/1
2 TABLET ORAL AS NEEDED
Status: DISCONTINUED | OUTPATIENT
Start: 2017-01-01 | End: 2017-01-01

## 2017-01-01 RX ORDER — IPRATROPIUM BROMIDE AND ALBUTEROL SULFATE 2.5; .5 MG/3ML; MG/3ML
3 SOLUTION RESPIRATORY (INHALATION) EVERY 4 HOURS PRN
Qty: 120 VIAL | Refills: 1 | Status: SHIPPED | OUTPATIENT
Start: 2017-01-01 | End: 2017-01-01

## 2017-01-01 RX ORDER — DEXAMETHASONE 4 MG/1
4 TABLET ORAL EVERY 8 HOURS SCHEDULED
Qty: 30 TABLET | Refills: 0 | Status: SHIPPED | COMMUNITY
Start: 2017-01-01 | End: 2017-01-01

## 2017-01-01 RX ORDER — NITROGLYCERIN 0.4 MG/1
0.4 TABLET SUBLINGUAL EVERY 5 MIN PRN
Status: DISCONTINUED | OUTPATIENT
Start: 2017-01-01 | End: 2017-01-01

## 2017-01-01 RX ORDER — POTASSIUM CHLORIDE 20 MEQ/1
20 TABLET, EXTENDED RELEASE ORAL 2 TIMES DAILY
Status: DISCONTINUED | OUTPATIENT
Start: 2017-01-01 | End: 2017-01-01

## 2017-01-01 RX ORDER — DIPHENOXYLATE HYDROCHLORIDE AND ATROPINE SULFATE 2.5; .025 MG/1; MG/1
1-2 TABLET ORAL 4 TIMES DAILY PRN
Qty: 30 TABLET | Refills: 0 | Status: SHIPPED | OUTPATIENT
Start: 2017-01-01 | End: 2017-01-01

## 2017-01-01 RX ORDER — HYDROMORPHONE HYDROCHLORIDE 2 MG/1
TABLET ORAL EVERY 4 HOURS PRN
Qty: 180 TABLET | Refills: 0 | Status: SHIPPED | OUTPATIENT
Start: 2017-01-01 | End: 2018-01-01

## 2017-01-01 RX ORDER — LEVOFLOXACIN 750 MG/1
750 TABLET ORAL DAILY
Qty: 10 TABLET | Refills: 0 | Status: SHIPPED | OUTPATIENT
Start: 2017-01-01 | End: 2017-01-01

## 2017-01-01 RX ORDER — ONDANSETRON 2 MG/ML
4 INJECTION INTRAMUSCULAR; INTRAVENOUS EVERY 4 HOURS PRN
Status: DISCONTINUED | OUTPATIENT
Start: 2017-01-01 | End: 2017-01-01 | Stop reason: SDUPTHER

## 2017-01-01 RX ORDER — BICALUTAMIDE 50 MG/1
50 TABLET, FILM COATED ORAL DAILY
Status: DISCONTINUED | OUTPATIENT
Start: 2017-01-01 | End: 2017-01-01

## 2017-01-01 RX ORDER — DEXAMETHASONE SODIUM PHOSPHATE 4 MG/ML
10 VIAL (ML) INJECTION ONCE
Status: COMPLETED | OUTPATIENT
Start: 2017-01-01 | End: 2017-01-01

## 2017-01-01 RX ORDER — DIPHENOXYLATE HYDROCHLORIDE AND ATROPINE SULFATE 2.5; .025 MG/1; MG/1
2 TABLET ORAL 4 TIMES DAILY PRN
Qty: 40 TABLET | Status: SHIPPED
Start: 2017-01-01 | End: 2017-01-01

## 2017-01-01 RX ORDER — CLOPIDOGREL BISULFATE 75 MG/1
75 TABLET ORAL DAILY
Qty: 90 TABLET | Refills: 0 | Status: SHIPPED | OUTPATIENT
Start: 2017-01-01 | End: 2017-01-01

## 2017-01-01 RX ORDER — CARVEDILOL 6.25 MG/1
6.25 TABLET ORAL 2 TIMES DAILY WITH MEALS
Qty: 180 TABLET | Refills: 0 | Status: SHIPPED | OUTPATIENT
Start: 2017-01-01 | End: 2018-01-01

## 2017-01-01 RX ORDER — ENOXAPARIN SODIUM 100 MG/ML
40 INJECTION SUBCUTANEOUS NIGHTLY
Status: DISCONTINUED | OUTPATIENT
Start: 2017-01-01 | End: 2017-01-01

## 2017-01-01 RX ORDER — CETIRIZINE HYDROCHLORIDE 10 MG/1
10 TABLET ORAL 2 TIMES DAILY
Status: DISCONTINUED | OUTPATIENT
Start: 2017-01-01 | End: 2017-01-01

## 2017-01-01 RX ORDER — CIPROFLOXACIN 500 MG/1
500 TABLET, FILM COATED ORAL ONCE
Status: COMPLETED | OUTPATIENT
Start: 2017-01-01 | End: 2017-01-01

## 2017-01-01 RX ORDER — CLOPIDOGREL BISULFATE 75 MG/1
75 TABLET ORAL DAILY
Qty: 90 TABLET | Refills: 0 | Status: SHIPPED | OUTPATIENT
Start: 2017-01-01 | End: 2018-01-01

## 2017-01-01 RX ADMIN — DIPHENHYDRAMINE HCL 25 MG: 25 MG CAPSULE ORAL at 11:50:00

## 2017-01-01 RX ADMIN — SODIUM CHLORIDE: 9 INJECTION, SOLUTION INTRAVENOUS at 10:45:00

## 2017-01-01 RX ADMIN — HYDROMORPHONE HYDROCHLORIDE 0.2 MG: 1 INJECTION, SOLUTION INTRAMUSCULAR; INTRAVENOUS; SUBCUTANEOUS at 12:28:00

## 2017-01-01 RX ADMIN — POTASSIUM CHLORIDE 20 MEQ: 750 TABLET, EXTENDED RELEASE ORAL at 11:40:00

## 2017-01-01 RX ADMIN — ACETAMINOPHEN 650 MG: 325 TABLET ORAL at 11:50:00

## 2017-01-03 ENCOUNTER — OFFICE VISIT (OUTPATIENT)
Dept: FAMILY MEDICINE CLINIC | Facility: CLINIC | Age: 77
End: 2017-01-03

## 2017-01-03 VITALS
WEIGHT: 203.25 LBS | SYSTOLIC BLOOD PRESSURE: 134 MMHG | HEART RATE: 61 BPM | OXYGEN SATURATION: 96 % | TEMPERATURE: 98 F | BODY MASS INDEX: 30 KG/M2 | DIASTOLIC BLOOD PRESSURE: 68 MMHG

## 2017-01-03 DIAGNOSIS — N40.1 BENIGN NON-NODULAR PROSTATIC HYPERPLASIA WITH LOWER URINARY TRACT SYMPTOMS: ICD-10-CM

## 2017-01-03 DIAGNOSIS — R39.15 URINARY URGENCY: Primary | ICD-10-CM

## 2017-01-03 LAB
APPEARANCE: CLEAR
BILIRUBIN: NEGATIVE
GLUCOSE (URINE DIPSTICK): NEGATIVE MG/DL
LEUKOCYTES: NEGATIVE
MULTISTIX LOT#: NORMAL NUMERIC
NITRITE, URINE: NEGATIVE
OCCULT BLOOD: NEGATIVE
PH, URINE: 5.5 (ref 4.5–8)
PROTEIN (URINE DIPSTICK): NEGATIVE MG/DL
SPECIFIC GRAVITY: 1.02 (ref 1–1.03)
URINE-COLOR: YELLOW
UROBILINOGEN,SEMI-QN: 0.2 MG/DL (ref 0–1.9)

## 2017-01-03 PROCEDURE — 99213 OFFICE O/P EST LOW 20 MIN: CPT | Performed by: FAMILY MEDICINE

## 2017-01-03 PROCEDURE — 81003 URINALYSIS AUTO W/O SCOPE: CPT | Performed by: FAMILY MEDICINE

## 2017-01-03 RX ORDER — TAMSULOSIN HYDROCHLORIDE 0.4 MG/1
0.4 CAPSULE ORAL NIGHTLY
Qty: 30 CAPSULE | Refills: 0 | Status: SHIPPED | OUTPATIENT
Start: 2017-01-03 | End: 2017-01-13

## 2017-01-03 NOTE — PROGRESS NOTES
Jody Gomez is a 77year old male. Patient presents with: Other: slow urine--having trouble urinating, will start and stop. ...started approx 1 month ago--pt is also having unexplained weight gain-- pt states he is not overindulging, and he is working ou Radiation proctitis    • GERD (gastroesophageal reflux disease)    • AAA (abdominal aortic aneurysm) (HCC)      3.8 cm by CT 10/2014, 3.3 cm on CT 2009   • Pulmonary nodule 2002     bilat, extensive w/u at [facility], no evidence of malignancy, diagnosis  ABPA rashes,no suspicious lesions  LUNGS: clear to auscultation  CARDIO: RRR without murmur  ABD soft, nontender, normal BS, no masses, rebound or guarding. No organomegaly or CVA tenderness.   EXTREMITIES: no edema      Recent Results (from the past 24 hour(s))

## 2017-01-13 ENCOUNTER — TELEPHONE (OUTPATIENT)
Dept: FAMILY MEDICINE CLINIC | Facility: CLINIC | Age: 77
End: 2017-01-13

## 2017-01-13 RX ORDER — TAMSULOSIN HYDROCHLORIDE 0.4 MG/1
0.4 CAPSULE ORAL NIGHTLY
Qty: 90 CAPSULE | Refills: 1 | Status: SHIPPED | OUTPATIENT
Start: 2017-01-13 | End: 2017-02-12

## 2017-01-16 ENCOUNTER — APPOINTMENT (OUTPATIENT)
Dept: LAB | Age: 77
End: 2017-01-16
Attending: FAMILY MEDICINE
Payer: MEDICARE

## 2017-01-16 DIAGNOSIS — C61 PROSTATE CANCER (HCC): ICD-10-CM

## 2017-01-16 LAB — PSA SERPL-MCNC: 7.6 NG/ML (ref 0.01–4)

## 2017-01-16 PROCEDURE — 36415 COLL VENOUS BLD VENIPUNCTURE: CPT

## 2017-01-16 PROCEDURE — 84153 ASSAY OF PSA TOTAL: CPT

## 2017-02-17 NOTE — PROGRESS NOTES
Rafia Dee is a 77year old male. Patient presents with: Other: f/up on dysuria when pt CAN urinate. .... having trouble urinating. .... started quite a while ago. ... not getting better. ..dysuira wasnt present before and leakage was not either. ...room 2 History of MI (myocardial infarction)    • Hyperlipidemia    • Radiation proctitis    • GERD (gastroesophageal reflux disease)    • AAA (abdominal aortic aneurysm) (HCC)      3.8 cm by CT 10/2014, 3.3 cm on CT 2009   • Pulmonary nodule 2002     bilat, exte nourished,in no apparent distress  SKIN: no rashes,no suspicious lesions  HEENT: atraumatic, normocephalic, R TM normal, L TM normal, Pharynx normal  NECK: supple, no cervical adenopathy  LUNGS: clear to auscultation  CARDIO: RRR without murmur  ABD soft,

## 2017-02-21 NOTE — TELEPHONE ENCOUNTER
Not a good idea until [facility]  If unable to void and uncomfortable has to go to ER

## 2017-02-21 NOTE — TELEPHONE ENCOUNTER
His voiding has slowed down despite doubling up on the medication.  I explained that if he can not void he should go to the ED to make sure that there is no blockage, etc.    He would like me to ask Dr Melyssa White about getting a script for a catheter if need

## 2017-02-21 NOTE — TELEPHONE ENCOUNTER
Is having troubles urinating. Was told to call if he continued to have this issue and Dr. Brittani John would advise him of what to do. Please call early this morning if possible    You saw the patient on the 17th. Calling to see if he doubled the Flomax?  If

## 2017-02-21 NOTE — TELEPHONE ENCOUNTER
Please get him an appointment with Dr. Fanta Martinez. Send a copy of Ernie's history to Dr. Fanta Martinez as well.

## 2017-02-28 NOTE — TELEPHONE ENCOUNTER
pt has appt for preop tomorrow morning for surgery with Dr. Tarah Rich. Please make sure orders are received and placed in epic for appt.     I called Dr Luis Miguel Sloan- spoke to [facility] - she will fax the order over

## 2017-03-01 NOTE — H&P
Froy Duarte is a 77year old male who presents for a pre-operative physical exam.     Patient presents with: Other: Pre op for Dr. Jean-Pierre Jennings for cystoscopy, Uroflow, US at [facility] [place] on [place]/17. [place] pt has had anesthesia before with no complica [facility])      [place] grade 4 adenocarcinoma dx in 1997   • Cancer of parotid gland ([address] 2005     low grade mucoepidermoid carcinoma right parotid, [facility]   • HTN (hypertension)    • History of MI (myocardial infarction)    • H Quit date: 01/23/1965    Smokeless Status: Never Used                        Alcohol Use: Yes           0.0 oz/week       0 Standard drinks or equivalent per week       Comment: occ    Drug Use: No                  REVIEW OF SYSTEMS:   GENERAL: feels well Encounter  CBC W Differential W Platelet [E]  Comp Metabolic Panel (14) [E]  PSA (Monitoring/Diagnostic) [E]  *Venipuncture    Meds & Refills for this Visit:  No prescriptions requested or ordered in this encounter    Imaging & Consults:  ELECTROCARDIOGRAM

## 2017-03-02 NOTE — PROGRESS NOTES
Quick Note:    Please forward lab results to Dr. Noel Yusuf. Notify Quiana Kip his blood count is stable. Chemistry profile is unremarkable.  PSA is lower than it has been in the last 10 months.  ______

## 2017-03-07 PROBLEM — J43.9 PULMONARY EMPHYSEMA (HCC): Status: ACTIVE | Noted: 2017-01-01

## 2017-03-07 NOTE — PROGRESS NOTES
HPI:    Patient ID: Zaria Cage is a 77year old male. X 1 wk  + cough  W/o ge  + PND  Neb not working need new neb - has had x over 20 yrs  HPI    Review of Systems   Constitutional: Positive for fatigue. Negative for fever and chills.    HENT: Positiv Sulfamethoxazole-TMP -160 MG Oral Tab per tablet  Disp:  Rfl:      Allergies:  Crestor [Rosuvastat*        Comment:Breathing problems with generic   PHYSICAL EXAM:   Physical Exam   Constitutional: He appears well-developed and well-nourished.  No d

## 2017-03-07 NOTE — PATIENT INSTRUCTIONS
Continue neb treatments as directed. Medications as directed. Call with questions or problems.   REST,FLUIDS,ADVIL / TYLENOL PRN fever / body aches  CALL NO CHANGE WORSENING  DISCUSSED WARNING SIGNS  F/U No change 2-3 days

## 2017-04-14 NOTE — PROGRESS NOTES
Saeed Díaz is a 77year old male. Patient presents with: Other: possible UTI, urinary frequency , burning w urination, onset yesterday. . room 1      HPI:     Patient presents with symptoms of UTI.  Complaining of urinary frequency, urgency, dysuria, sup hx of prev IWMI   • S/P CABG (coronary artery bypass graft) [address] [facility] did original CABG, repeat bypass in 2007 by Dr Vannesa Esqueda  at [facility]   • Prostate cancer [facility])      [place] grade 4 adenocarcinoma dx in 1997   • Cancer of parotid gland (H masses, rebound or guarding. No organomegaly or CVA tenderness.         Recent Results (from the past 24 hour(s))  -URINALYSIS, AUTO, W/O SCOPE   Collection Time: 04/14/17 10:00 AM   Result Value Ref Range   GLUCOSE (URINE DIPSTICK) neg Negative mg/dL   ANAND

## 2017-04-20 NOTE — PROGRESS NOTES
Pt here for 8 month MD f/u. Pt had problems with pain in pelvis and problems urinating. Pt saw urology. Pt has also had sciatic pain and pain in right rib cage, only takes Tylenol, doesn't like Norco. Energy level and appetite is fair.      Education Record

## 2017-04-26 NOTE — TELEPHONE ENCOUNTER
----- Message from Rock Macario RN sent at 4/24/2017  3:56 PM CDT -----  Contact: [phone]  Patient calling to report to Dr. Morelia Plata that he \"tried the Ativan and he did not think it had any effect\".   He believes he was to try to see if help baljit

## 2017-04-26 NOTE — PROGRESS NOTES
[address]    PATIENT'S NAME: Radha Zapata   ATTENDING PHYSICIAN: Niki Kim M.D.    PATIENT ACCOUNT #: [de-identified] LOCATION: [address] [place] [address] RECORD #: [id number] YOB: 1940   DATE OF SERVICE: 04/20/2017       C[facility]ENTER \"wobbly. \"  His current medications include albuterol 2 puffs q.4 h. p.r.n.; aspirin 325 mg daily; atorvastatin 40 mg nightly; carvedilol 6.25 mg twice daily; vitamin D 400 units daily; ciprofloxacin 500 mg b.i.d., which was just administered; clopidogrel would relax him enough where he thinks he would be able to manage it. If not, he will call us and then we will ask for the MRI to be done with sedation. He understands and agrees.   He otherwise is clinically stable and does not seem to have any other act

## 2017-04-27 PROBLEM — M54.14 RADICULAR PAIN OF THORACIC REGION: Status: ACTIVE | Noted: 2017-01-01

## 2017-04-27 PROBLEM — M89.8X8 MASS OF SPINE: Status: ACTIVE | Noted: 2017-01-01

## 2017-04-27 NOTE — CONSULTS
Hematology/Oncology Initial Consultation Note    Patient Name: João Matamoros  Medical Record Number: [id number]    YOB: 1940   Date of Consultation: 4/27/2017   Physician requesting consultation: Dr. Martita Christine    Reason for Consultation: though has chronic LUTS with some hesitancy. No bowel changes. No other new pains or aches. He has been taking aspirin and plavix for history of CAD.      Past Medical History:  Past Medical History   Diagnosis Date   • CAD (coronary artery disease) Rfl:    Clopidogrel Bisulfate 75 MG Oral Tab Take 75 mg by mouth daily. Disp:  Rfl:    ramipril 5 MG Oral Cap Take 5 mg by mouth daily. Disp:  Rfl:    Atorvastatin Calcium (LIPITOR) 40 MG Oral Tab Take 1 tablet (40 mg total) by mouth nightly.  Disp: 90 tabl Onset   • Heart Disorder Father    • Heart Disorder Mother    • Cancer Sister    • Heart Disorder Sister    • Cancer Brother    • Heart Disorder Brother    • Cancer Brother    • Heart Disorder Brother        Review of Systems:  A 10-point ROS was done with hours.    Imaging:    CT a/p 4/27/17: FINDINGS:     LUNG BASE:  Bronchiectasis with pleural parenchymal scarring. Right lower lobe, 5 mm nodular density may represent mucus impaction.   LIVER:  1 cm low density lesion along the dome is too small to accurate Followed by Dr. Salma Morris. Last PSA was about 2 months ago.    -repeat PSA, if increased significantly can probably conclude his bone lesion represents prostate cancer metastasis and start ADT    *low thoracic bone lesion with thoracic radicular pain  -obtain

## 2017-04-27 NOTE — H&P
[facility] HOSPITALIST  History and Physical     Fam Rehman Patient Status:  Emergency    1940 MRN [id number]   Location [address] Attending [place] MD   Hosp Day # 0 PCP Gunnar Mcgill DO     Chief Complaint • History of cardiovascular stress test 2011     fixed inferior wall defect, poor exercise tolerance   • Chronic systolic CHF (congestive heart failure) ([facility])    • Allergic rhinitis 6/25/2014   • Coronary artery disease involving native heart with angina 0.4 MG Oral Cap Take 0.4 mg by mouth daily. Disp:  Rfl:    carvedilol 6.25 MG Oral Tab Take 6.25 mg by mouth 2 (two) times daily with meals. Disp:  Rfl:    Clopidogrel Bisulfate 75 MG Oral Tab Take 75 mg by mouth daily.  Disp:  Rfl:    ramipril 5 MG Oral Ca 10   CREATSERUM  1.01   CA  9.7   ALB  3.5   NA  138   K  4.1   CL  104   CO2  27.0   ALKPHO  110   AST  26   ALT  31   BILT  0.5   TP  7.7       Estimated Creatinine Clearance: 62.2 mL/min (based on Cr of 1.01).     No results for input(s): PTP, INR in the

## 2017-04-27 NOTE — ED PROVIDER NOTES
Patient Seen in: [facility] Emergency Department    History   Patient presents with:  Abdomen/Flank Pain (GI/)    Stated Complaint: abd pain    HPI    Patient complains of upper abdominal pain. Patient has had discomfort for about 2 weeks now.   R Radiation cystitis 2009     hematuria , cystoscopy Dr Hero Jon   • Pelvic fracture ([address]      plated , screw worked loose and punctured bladder causing hematuria, had plate and screws removed [facility]   • History of cardiovascular stress test 2011 mg by mouth daily.        Family History   Problem Relation Age of Onset   • Heart Disorder Father    • Heart Disorder Mother    • Cancer Sister    • Heart Disorder Sister    • Cancer Brother    • Heart Disorder Brother    • Cancer Brother    • Heart Disord No pedal edema. Skin: Unremarkable. No skin change or skin rash in the area patient's discomfort. Neurologic:  Mental status as above. Patient moves all extremities with good strength and coordination.          ED Course     Labs Reviewed   CBC W/ DIF pain includes aneurysm as well as biliary colic or even pancreatitis.   Complications from his prostate cancer also considered    CBC shows normal white count, hematocrit, and platelets  Metabolic panel unremarkable   Lipase normal  Urinalysis  Troponin neg encounter diagnosis)  Radicular pain of thoracic region    Disposition:  Admit    Follow-up:  No follow-up provider specified.     Medications Prescribed:  Current Discharge Medication List        Present on Admission  Date Reviewed: 4/26/2017          ICD-

## 2017-04-28 PROBLEM — D64.9 ANEMIA, UNSPECIFIED: Status: ACTIVE | Noted: 2017-01-01

## 2017-04-28 PROBLEM — R31.29 MICROSCOPIC HEMATURIA: Status: ACTIVE | Noted: 2017-01-01

## 2017-04-28 NOTE — ANESTHESIA POSTPROCEDURE EVALUATION
Ronald Marino Patient Status:  Inpatient   Age/Gender 69year old male MRN [id number]   [facility] 4NW-A Attending Xena Marques MD   [facility] Day # 1 PCP Meryle Cullens, DO       Anesthesia Post-op Note    * No procedures

## 2017-04-28 NOTE — PROGRESS NOTES
[facility] HOSPITALIST  Progress Note     Katharine Hides Patient Status:  Inpatient    1940 MRN [id number]   [facility] 4NW-A Attending Xena Marques MD   [facility] Day # 1 PCP Meryle Cullens, DO     Chief Complaint: abd/back pain    S: Pa breakfast   • enoxaparin  40 mg Subcutaneous Nightly   • dexamethasone Sodium Phosphate  4 mg Intravenous Q6H       ASSESSMENT / PLAN:     1. Thoracic Lumbar Spine Mass  1. Continue IV decadron  2. Hematology following  3. Rad Onc to eval  4.  Follow up MRI

## 2017-04-28 NOTE — PROGRESS NOTES
Pt admitted c/o abdominal and back pain. Pt was given Iv dilaudid and norco states pain is better 4/10 pt was able to ambulate to the bathroom. Pt will have mri tomorrow under iv sedation. Pt instructed to remain npo after midnight.  Report given to the nex

## 2017-04-28 NOTE — IMAGING NOTE
Pt tolerated procedure well, vss on RA, presently denies increased pain and dressing is CDI. Pt updated on procedure and plan of care, report called to Shawn Calix RN and awaiting transport to room# 417.

## 2017-04-28 NOTE — CM/SW NOTE
04/28/17 1200   CM/SW Screening   Referral Source    Patient's Mental Status Alert;Oriented   Patient's [address]   Number of Levels in Home 2   Number of Stair in Home 7   Patient lives with Spouse   Patient Status Prior to Adm

## 2017-04-28 NOTE — PROGRESS NOTES
Heme/Onc Progress Note    Patient Name: Zaria Cage   YOB: 1940   Medical Record Number: [id number]   CSN: [id number]   Attending Physician: Oliver Brady M.D. Subjective:  Feels much better with steroids.   Had severe pain with standi Sodium Phosphate (DECADRON) 4 MG/ML injection 4 mg, 4 mg, Intravenous, Q6H  •  HYDROcodone-acetaminophen (NORCO) 5-325 MG per tab 1 tablet, 1 tablet, Oral, Q4H PRN **OR** HYDROcodone-acetaminophen (NORCO) 5-325 MG per tab 2 tablet, 2 tablet, Oral, Q4H PRN CO2 27.0 22.0-32.0 mmol/L   -LIPASE   Collection Time: 04/27/17 10:14 AM   Result Value Ref Range   Lipase 143  U/L   -TROPONIN I   Collection Time: 04/27/17 10:14 AM   Result Value Ref Range   Troponin <0.046 <0.046 ng/mL   -CBC W/ DIFFERENTIAL Esterase Urine Trace (A) Negative   WBC Urine 5-10 (A) <5 /HPF   RBC URINE 6-10 (A) 0-2 /HPF   Bacteria Urine None Seen None Seen   Squamous Epi.  Cells None Seen Small /LPF   Renal Tubular Epithelial Cells None Seen Small /LPF   Transitional Cells None See compression fracture given the volume of the vertebral body involved. Cautioned him about any activity that would impact the spine. Add monoclonal protein study to rule out myeloma.     2)  History of prostate cancer SP RT in 1999 and with biochemical rel

## 2017-04-28 NOTE — PROCEDURES
[facility]  Procedure Note    Renzo Murray Patient Status:  Inpatient    1940 MRN [id number]   [facility] 4NW-A Attending Danielle Costa MD   [facility] Day # 1 PCP Addison DO Will     Procedure: CT guided vertebral biopsy    P

## 2017-04-28 NOTE — PLAN OF CARE
Return mobility to safest level of function Progressing      Maintain proper alignment of affected body part Progressing      Verbalizes/displays adequate comfort level or patient's stated pain goal Progressing      Free from fall injury Progressing    Was

## 2017-04-28 NOTE — CM/SW NOTE
BPCI:  Met with patient at bedside to explain the BPCI/Medicare program. Patient agreed with phone f/u for 3 months from [address] [place] [address] after discharge from [facility]. Patient was enrolled under . BPCI/Medicare Letter and Brochure provided.     Celestine Villarreal

## 2017-04-28 NOTE — ANESTHESIA PREPROCEDURE EVALUATION
PRE-OP EVALUATION    Patient Name: Kavita Alves    Pre-op Diagnosis: * No surgery found *    * No surgery found *    * Surgery not found *    Pre-op vitals reviewed.   Temp: 98.5 °F (36.9 °C)  Pulse: 66  Resp: 18  BP: 127/55 mmHg  SpO2: 97 %  Body mass inde (APRESOLINE) injection 10 mg 10 mg Intravenous Once   HYDROcodone-acetaminophen (NORCO) 5-325 MG per tab 1 tablet 1 tablet Oral Q4H PRN   Or      HYDROcodone-acetaminophen (NORCO) 5-325 MG per tab 2 tablet 2 tablet Oral Q4H PRN       Outpatient Medications pain.                                      Past Surgical History    COLONOSCOPY      Comment normal 2014    LAPAROSCOPY, SURGICAL PROSTATECTOMY, RETROPUBIC RADICAL, W/NERVE SPARING      CABG      Comment 1990 / 2006    OTHER SURGICAL HISTORY      Comment b

## 2017-04-29 NOTE — DISCHARGE SUMMARY
[facility] HOSPITALIST  DISCHARGE SUMMARY     Vira Robledo Patient Status:  Inpatient    1940 MRN [id number]   [facility] 4NW-A Attending Faraz Bean MD   [address] Day # 2 PCP Beatrice Sampson DO     Date of Admission: 2017  Date results. Pt was advised to start ASA and plavix on day after discharge.      Procedures during hospitalization:   • Spinal Mass biopsy at T11    Incidental or significant findings and recommendations (brief descriptions):  • none    Lab/Test results pending Generic drug:  aspirin        Take 325 mg by mouth daily. Refills:  0       FLOMAX 0.4 MG Caps   Generic drug:  tamsulosin HCl        Take 0.4 mg by mouth daily.     Refills:  0       ramipril 5 MG Caps   Last time this was given:  5 mg on 4/29/2017  8

## 2017-04-29 NOTE — PROGRESS NOTES
NURSING DISCHARGE NOTE    Discharged Home via Wheelchair. Accompanied by Spouse  Belongings Taken by patient/family. Pt dc'd aaox4, denies pain, dc instructions given to wife/pt, verbalized understanding.

## 2017-04-29 NOTE — PROGRESS NOTES
Patient had some periods of confusion during the night. Staff was able to calm patient,who eventually went to sleep. IV fluids ongoing,IV decadron given per MD orders. Resting at this time.

## 2017-04-29 NOTE — PROGRESS NOTES
Heme/Onc Progress Note    Patient Name: Alisha Mukherjee   YOB: 1940   Medical Record Number: [id number]     Attending Physician: Fabio Donaldson MD.    Subjective:  Patient states that he feels better than admission. Pain is well controlled.  Had close to chronological age. Head Normocephalic and atraumatic. Eyes Conjunctiva clear; sclera anicteric. ENMT External nose normal; external ears normal.  Respiratory Normal effort; no respiratory distress. Cardiovascular Regular rate and rhythm.   Extr

## 2017-05-01 NOTE — TELEPHONE ENCOUNTER
Trying to contact patient about biopsy result. Left message to call us back - biopsy results are consistent with prostate cancer - final stains pending. Needs radiation to the site. I called Dr Brett Wagner and they can get him in this afternoon.   ALso mayra

## 2017-05-01 NOTE — TELEPHONE ENCOUNTER
Spoke with patient by phone.     Asked him to start casodex today, medication at the pharmacy stop    Will see him Thursday with labs, discuss steroid taper and initiate trelstar

## 2017-05-02 NOTE — PROGRESS NOTES
Nursing Consultation Note  Patient: Sharan Mittal  YOB: 1940  Age: 69year old  Radiation Oncologist: Dr. Le Buerger  Referring Physician: Delfina Knutson  Diagnosis: PROSTATE CANCER WITH BONE METS  Consult Date: 5/2/2017    Hist Radiation proctitis    • GERD (gastroesophageal reflux disease)    • AAA (abdominal aortic aneurysm) (HCC)      3.8 cm by CT 10/2014, 3.3 cm on CT 2009   • Pulmonary nodule 2002     bilat, extensive w/u at [facility], no evidence of malignancy, diagnosis  ABPA Disp: 30 tablet Rfl: 0   dexamethasone (DECADRON) 4 MG tablet Take 1 tablet (4 mg total) by mouth every 8 (eight) hours. Disp: 30 tablet Rfl: 0   HYDROmorphone HCl 2 MG Oral Tab Take 1 tablet (2 mg total) by mouth every 3 (three) hours as needed.  Disp: 60 Wt 90.175 kg (198 lb 12.8 oz)  BMI 30.23 kg/m2  SpO2 96%    Wt Readings from Last 6 Encounters:  05/02/17 : 90.175 kg (198 lb 12.8 oz)  04/27/17 : 88.814 kg (195 lb 12.8 oz)  04/20/17 : 90.629 kg (199 lb 12.8 oz)  04/14/17 : 88.565 kg (195 lb 4 oz)  03/07/

## 2017-05-03 PROBLEM — C7B.8 SECONDARY NEUROENDOCRINE TUMOR OF BONE(209.73): Status: ACTIVE | Noted: 2017-01-01

## 2017-05-03 NOTE — CONSULTS
[address]    PATIENT'S NAME: Hollis Knight   RADIATION ONCOLOGIST: Emily Garvey M.D.    PATIENT ACCOUNT #: [de-identified] [facility]   MEDICAL RECORD #: [id number] YOB: 1940   CONSULTATION DATE: 05/02/2017       RADIATI the spine as he had radicular symptoms that had improved with steroids. He also ordered an MRI of the pelvis.   He was evaluated in the emergency room for pain and neurologic symptoms and was admitted to [facility].      Most recent imaging included a a long time, he does develop pain in the T11 region, but denies pain in the pelvis or ischial tuberosity region.   He is now referred for consideration of radiation therapy to the T11 site of epidural extension of tumor in the ventral epidural space along t has joint pain. PHYSICAL EXAMINATION:    GENERAL:  A well-appearing gentleman in no acute distress. VITAL SIGNS:  Blood pressure is 180/72, pulse is 61, temperature is 98.4, respiratory rate is 20, weight is 198, O2 level is 96% on room air.   HEENT:

## 2017-05-03 NOTE — TELEPHONE ENCOUNTER
Spoke to patient. Tried to explain about neuroendocrine de-differentiation that can be seen in prostate - essentially like a small cell. I think he should complete the RT but also needs a PET to look for other sites. Seeing Almonte Child in [facility] tomorrow.

## 2017-05-04 PROBLEM — C61 PROSTATE CANCER (HCC): Status: ACTIVE | Noted: 2017-01-01

## 2017-05-04 NOTE — PROGRESS NOTES
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 Hollis Butcher since 2015.   He has a history of prostate cancer dating ryan Clear to auscultation. Heart: Regular rate and rhythm. Abdomen: Soft, non tender with good bowel sounds. Extremities: Pedal pulses are present. No edema. Neurological: Grossly intact. Lymphatics:  There is no palpable lymphadenopathy throughout in th

## 2017-05-04 NOTE — PATIENT INSTRUCTIONS
Decrease dexamethasone to 4mg twice daily  Call kath capone weekly to assess pain and side effects.     PET scan in 2-3 weeks when radiation completed  Dr. Dae Steve after PET  Continue casodex

## 2017-05-04 NOTE — PROGRESS NOTES
Pt here for APN assessment. Currently taking Casodex daily and Dexamethasone 4 mg TID. Pt will also get trelstar injection today.

## 2017-05-05 NOTE — PROGRESS NOTES
SW completed handicap parking application and patient can  at first floor reception desk in [place] when he comes for radaition appointment on 5/8.

## 2017-05-05 NOTE — PROGRESS NOTES
[facility] Radiation Treatment Management Note 1-5    Patient:  Pricilla Gordon  Age:  69year old  Visit Diagnosis:    1.  Secondary cancer of bone ([address]      Primary Rad/Onc:  Dr. Jose Roberto Gutierres      Site Delivered Dose (Gy) Prescribed

## 2017-05-09 NOTE — TELEPHONE ENCOUNTER
Per Dr. Bhavin Mcclelland: she spoke to Dr. Mullnis Smiling regarding pt's new symptoms (michelle-colored stools, rapid weight loss) and recommended for pt to schedule his PET/CT sooner. Called pt and instructed him to call central scheduling to schedule PET/CT.  Phone number

## 2017-05-10 NOTE — TELEPHONE ENCOUNTER
Left message with patient about PET scan. Told him to let me know what time he will be at [facility] and I will make time to see him and explain and discuss options - appears to be relatively widespread and active like a small cell. Will need carbo/-16.   A H

## 2017-05-10 NOTE — PROGRESS NOTES
Nutrition Consultation    Patient Name: Barbara Malave  YOB: 1940  Medical Record Number: [id number]   Account Number: [de-identified]  Dietitian: Opal Muro RD    Date of visit: 5/10/2017    Diet Rx: High protein/calorie, low residue    Pert 60 tablet, Rfl: 0  •  bicalutamide 50 MG Oral Tab, Take 1 tablet (50 mg total) by mouth daily. , Disp: 30 tablet, Rfl: 0  •  PEG 3350 Oral Powd Pack, Over the counter. Use daily. , Disp: , Rfl: 0  •  HYDROmorphone HCl 2 MG Oral Tab, Take 1 tablet (2 mg total frequent feeds w/ a fair intake of high sugar foods. RD reviewed information as noted above suggesting spreading Ensure out throughout the day taking only 2 oz each time; increasing protein intake. Spouse appeared supportive and took notes throughout.  Pt n

## 2017-05-10 NOTE — PROGRESS NOTES
Pt with more weight loss, down another 4 pounds. He met with Aurea Pittman today for diet recommendations. I reviewed how to take imodium, and to let us know if he it taking more than 6-7 per day, and may discuss lomotil. Pt having PET scan today.    Kendall Kevin

## 2017-05-11 PROBLEM — C80.1: Status: ACTIVE | Noted: 2017-01-01

## 2017-05-11 PROBLEM — C79.51: Status: ACTIVE | Noted: 2017-01-01

## 2017-05-11 NOTE — PROGRESS NOTES
Pt here for MD shoemaker/kaye to discuss treatment options. Pt has been doing XRT treatments daily. Energy level has been low, appetite is good. Pt eating well, but has had bad diarrhea.  Started taking more Imodium yesterday, has only had 1 bm this am. Pt notes eren

## 2017-05-12 NOTE — PATIENT INSTRUCTIONS
POST-RADIATION INSTRUCTIONS:   - FOLLOW-UP WITH DR. Maurizio John AS NEEDED/RECOMMENDED  - CHEMO EDUCATION WITH NURSE PRACTITIONER ON MAY 22 AT 10:30 AM   - SIDE EFFECTS OF RADIATION WILL GRADUALLY SUBSIDE, IT MAY TAKE UP TO 2 WEEKS POST-RADIATION FOR YOU TO

## 2017-05-15 NOTE — PROGRESS NOTES
[facility] Radiation Treatment Management Note 6-10    Patient:  Paul Jeronimo  Age:  69year old  Visit Diagnosis:    1.  Secondary cancer of bone ([address]      Primary Rad/Onc:  Dr. Emily Garvey      Site Delivered Dose (Gy) Prescribe

## 2017-05-16 NOTE — PROGRESS NOTES
[address]    PATIENT'S NAME: Thyra Favre   ATTENDING PHYSICIAN: Josephine Contreras M.D.    PATIENT ACCOUNT #: [de-identified] LOCATION: [address] [place] [address] RECORD #: [id number] YOB: 1940   DATE OF SERVICE: 05/11/2017       CANCER CENTER dose tapered. His energy level is low. His appetite remains good. He is eating well but he has had some bad diarrhea. He started taking more Imodium yesterday and he only had 1 bowel movement this morning.   He is having mild right flank pain that is mo metastases and some soft tissue metastases in the lung and pleura. He is completing a course of radiotherapy to the spine. We are planning to treat him with systemic chemotherapy. I have recommended that he come in on May 22 to start treatment.   The wagner

## 2017-05-22 PROBLEM — Z71.89 OTHER SPECIFIED COUNSELING: Status: ACTIVE | Noted: 2017-01-01

## 2017-05-22 NOTE — PROGRESS NOTES
Education Record    Learner:  Patient    Disease / Diagnosis: prostate cancer    Barriers / Limitations:  None   Comments:    Method:  Reinforcement   Comments:    General Topics:  Side effects and symptom management and Plan of care reviewed   Comments:

## 2017-05-22 NOTE — PROGRESS NOTES
IV Chemotherapy Education    Patient:Ernie Armendariz    Date:  5/22/17  Diagnosis: Small cell, neuroendocrine tumor, prostate ca  Caregivers present: Wife, daughter    Drug names:  Carboplatin, Etoposide    Treatment Effects on Bone Marrow:  Chemotherapy acti Sheet  [employer] Information sheet    Patient and caregiver were given ample opportunity to ask questions. All questions and concerns addressed. We discussed self care techniques, symptom management, fluid, diet, and activities.       Chemotherap

## 2017-05-22 NOTE — PROGRESS NOTES
RADIATION ONCOLOGY TREATMENT SUMMARY         [address] [facility] Location: [facility]   Date of Birth   4/23/1940 Radiation Oncologist     Alis Trujillo MD, [address] spine as he had radicular symptoms that had improved with steroids.  He also ordered an MRI of the pelvis.  He was evaluated in the emergency room for pain and neurologic symptoms and was admitted to [facility]     Most recent imaging included a CT 4/23/1940  Radiation Oncologist: Saeid Painter    Summary of Radiation Therapy:     Plan/Prescription:  Course ID Plan ID Rx Dose (Gy) Fraction Status   1 T11 Spine T11 30 10 / 10 Treatment Approved       Treatment Summary: Course: 1 T11 Spine    Damon

## 2017-05-23 NOTE — PROGRESS NOTES
After finishing chemotherapy today, patient used bathroom and noted that he passed a small dark red clot with urinating. RN looked at urine. Urine clear and yellow except for dark red thin clot passed. No bright red bleeding noted. Patient denies any pain.

## 2017-05-23 NOTE — PROGRESS NOTES
Education Record    Learner:  Patient and Spouse    Disease / Diagnosis: small cell carcinoma    Barriers / Limitations:  None   Comments:    Method:  Discussion   Comments:    General Topics:  Medication, Side effects and symptom management and Plan of ca

## 2017-05-23 NOTE — PROGRESS NOTES
Date of Treatment: 5/22/17                                Type of Chemo: Carbo, Etop    Comments: Pt here for C1 D2 treatment- see nursing note. Recommendations: see documentation.

## 2017-05-24 NOTE — PROGRESS NOTES
Education Record    Learner:  Patient    Disease / Diagnosis:    Barriers / Limitations:  None    Method:  Brief focused, printed material and  reinforcement    General Topics:  Plan of care reviewed    Outcome:  Shows understanding

## 2017-05-30 NOTE — PROGRESS NOTES
ANP Visit Note    Patient Name: Fam Rehman   YOB: 1940   Medical Record Number: [id number]   CSN: [id number]   Date of visit: 5/30/2017       Chief Complaint/Reason for Visit: Followup and treatment of prostate cancer with apparent neuroendo of prev IWMI   • S/P CABG (coronary artery bypass graft) [address] [facility] did original CABG, repeat bypass in 2007 by Dr Jeromy Jacobs  at [facility]   • Cancer of parotid gland [facility]) 2005     low grade mucoepidermoid carcinoma right parotid, underwent right p History   Marital Status:   Spouse Name: Tamara Lucas    Years of Education: N/A  Number of Children: 1     Occupational History  retired, worked as an        Social History Main Topics   Smoking status: Former Smoker  2.00 Packs/Day  For 20.00 tamsulosin HCl (FLOMAX) 0.4 MG Oral Cap, Take 0.4 mg by mouth daily. , Disp: , Rfl:   •  carvedilol 6.25 MG Oral Tab, Take 6.25 mg by mouth 2 (two) times daily with meals. , Disp: , Rfl:   •  Clopidogrel Bisulfate 75 MG Oral Tab, Take 75 mg by mouth daily. , 8-20 mg/dL Status: Final   Creatinine Date: 05/22/2017   Value: 0.81  Ref Range 0.70-1.30 mg/dL Status: Final   GFR Date: 05/22/2017   Value: 86  Ref Range >=60 Status: Final    Comment:   Estimated GFR units: mL/min/1.73 square meters   eGFR calculated by Range 27.0-33.2 pg Status: Final   MCHC Date: 05/22/2017   Value: 33.3  Ref Range 31.0-37.0 g/dL Status: Final   RDW Date: 05/22/2017   Value: 16.7* Ref Range 11.5-16.0 % Status: Final   RDW-SD Date: 05/22/2017   Value: 55.4* Ref Range 35.1-46.3 fL Status: emotional well-being and any concerns about anxiety or depression. We discussed issues of distress, coping difficulties and social support systems and currently there are no active problems.     Planned Follow Up: 6/12/17 patient to call with fevers or sig

## 2017-06-05 NOTE — TELEPHONE ENCOUNTER
Patient has been having loose stools chronically for the last 5 weeks  He states he is taking imodium and lomotil 8 per day of each  c-diff was negative 5/31  Does not appear to be food related, last chemo 5/24    Will discuss with Dr. Laura Lee and call the

## 2017-06-06 NOTE — TELEPHONE ENCOUNTER
Patient states he stopped the imodium and lomotil    Overnight the patient states the diarrhea has slowed    He states the stools overnight have been less and more formed.     He is going to stop the imodium and lomotil    Discussed use of cholestyramine 4g

## 2017-06-08 PROBLEM — R53.1 GENERAL WEAKNESS: Status: ACTIVE | Noted: 2017-01-01

## 2017-06-08 PROBLEM — R19.7 DIARRHEA, UNSPECIFIED TYPE: Status: ACTIVE | Noted: 2017-01-01

## 2017-06-08 PROBLEM — R53.1 WEAKNESS: Status: ACTIVE | Noted: 2017-01-01

## 2017-06-08 PROBLEM — D72.829 LEUKOCYTOSIS, UNSPECIFIED TYPE: Status: ACTIVE | Noted: 2017-01-01

## 2017-06-08 NOTE — ED PROVIDER NOTES
Patient Seen in: [facility] Emergency Department    History   Patient presents with:  Fatigue (constitutional, neurologic)    Stated Complaint: weakness    HPI    60-year-old male presents for evaluation of weakness.   Patient feels generally weak an angina pectoris ([facility]    • Prostate cancer ([facility]      [place] grade 4 adenocarcinoma dx in 1997, received XRT at [facility]           Past Surgical History    COLONOSCOPY      Comment normal 2014    LAPAROSCOPY, SURGICAL PROSTATECTOMY, RETROPUBIC RADI Inhalation Aero Soln,  Inhale 2 puffs into the lungs every 4 (four) hours as needed for Wheezing. Atorvastatin Calcium (LIPITOR) 40 MG Oral Tab,  Take 1 tablet (40 mg total) by mouth nightly.    Cholecalciferol (VITAMIN D) 400 UNITS Oral Cap,  Take 400 Un neurologic deficits. Normal speech pattern  Musculoskeletal: No tenderness or deformity noted. Full range of motion throughout.         ED Course     Labs Reviewed   URINALYSIS WITH CULTURE REFLEX - Abnormal; Notable for the following:     Clarity Urine H CULTURE   BLOOD CULTURE      EKG    Rate, intervals and axes as noted on EKG Report. Rate: 72  Rhythm: Sinus Rhythm  Reading: No ST elevation, frequent multifocal PVCs            MDM     White blood cell count is 26,000. Spoke with Dr. Kisha Ribeiro.   Patient

## 2017-06-08 NOTE — ED INITIAL ASSESSMENT (HPI)
Complaint of generalized fatigue and weakness for several weeks. Said he has had diarrhea for 6 weeks. His MD is aware and has put him on different medications, including Flagyl and Lomotil without relief.  Patient said he is having diarrhea approximately 1

## 2017-06-08 NOTE — TELEPHONE ENCOUNTER
Pt's wife calling, pt is very weak suddenly and wondering if pt should go to the ER. States that pt was out to the store this am, but was very tired, came home had lunch and layed down for nap. Notes having a hard time even getting up from the nap.  Notes p

## 2017-06-09 PROBLEM — E87.6 HYPOKALEMIA: Status: ACTIVE | Noted: 2017-01-01

## 2017-06-09 NOTE — OCCUPATIONAL THERAPY NOTE
OCCUPATIONAL THERAPY EVALUATION - INPATIENT     Room Number: 420/420-A  Evaluation Date: 6/9/2017  Type of Evaluation: Initial  Presenting Problem: UTI, weakness, c-diff    Physician Order: IP Consult to Occupational Therapy  Reason for Therapy: ADL/IADL D with angina pectoris [facility])    • Prostate cancer ([address]      [place] grade 4 adenocarcinoma dx in 1997, received XRT at [facility]       Past Surgical History      Past Surgical History    COLONOSCOPY      Comment normal 2014    LAPAROSCOPY, SURGICAL VT extremity ROM is within functional limits decr end range shdrs  Upper extremity strength is within functional limits 4+/5    COORDINATION  Gross Motor    WFL    Fine Motor    WFL      ADDITIONAL TESTS                       Other Test(s): MBI 12/20 year old male admitted 6/8/2017 for UTI, weakness, dehydration, c-diff.    In this moderate complexity OT evaluation the  patient presents with the following impairments: decreased balance, decreased proximal stability, decreased visual motor coordination, Potential : Good  Frequency (Obs): 5x/week  Number of Visits to Meet Established Goals: 5    ADL Goals  Patient will perform grooming: with supervision and while standing at sink  Patient will perform lower body dressing:  with modified independent and wit

## 2017-06-09 NOTE — PROGRESS NOTES
[facility] HOSPITAL[facility]  Progress Note     Pricilla Gordon Patient Status:  Inpatient    1940 MRN [id number]   [facility] 4NW-A Attending Josette Beckham, [address] [facility] Day # 1 PCP Michell Hernandez DO     Chief Complaint: diarrhea    S: Patient se carvedilol  6.25 mg Oral BID with meals   • Vitamin D (Cholecalciferol)  400 Units Oral Daily   • cholestyramine light  4 g Oral TID   • Clopidogrel Bisulfate  75 mg Oral Daily   • ramipril  5 mg Oral Daily   • Alfuzosin HCl ER  10 mg Oral Daily with break
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
## 2017-06-09 NOTE — PHYSICAL THERAPY NOTE
PHYSICAL THERAPY EVALUATION - INPATIENT     Room Number: 420/420-A  Evaluation Date: 6/9/2017  Type of Evaluation: Initial  Physician Order: PT Eval and Treat    Presenting Problem: Weakness, leukocytosis, and diarrhea  Reason for Therapy: Mobility Dys • Coronary artery disease involving native heart with angina pectoris [facility])    • Prostate cancer ([facility]      [place] grade 4 adenocarcinoma dx in 1997, received XRT at [facility]       Past Surgical History      Past Surgical History    COLONOSCOPY quad 4-/5, ankle 4/5    BALANCE  Static Sitting: Fair +  Dynamic Sitting: Fair +  Static Standing: Fair -  Dynamic Standing: Poor +    ADDITIONAL TESTS  Additional Tests: Elderly Mobility Scale     Elderly Mobility Scale: 12/20                           NE truncal/head ataxia (no steppage gait noted). Pt cont to c/o SOB and returned to his room sitting in [facility], however while transitioning there attempting to hold onto [facility] and IV pole (for each side). Pt sat c CGA and remained c family in the room.  O2 sats were assistance level: supervision     Goal #4 Pt will demo ability to negotiate 2 angelo c supervision by DC   Goal #5    Goal #6    Goal Comments: Goals established on 6/9/2017

## 2017-06-09 NOTE — PLAN OF CARE
NURSING ADMISSION NOTE      Patient admitted via Cart  Oriented to room. Safety precautions initiated. Bed in low position. Call light in reach.     Pt alert and oriented able to participate in admission navigator although does appear to have some me

## 2017-06-09 NOTE — CONSULTS
[facility]    Gastroenterology Initial Consultation    Doc Jennifer Patient Status:  Inpatient    1940 MRN [id number]   [facility] 4NW-A Attending Miranda Bhagat DO   Hosp Day # 1 PCP Eunice Nicholson DO       Reason for Consult heart failure) ([facility]    • Allergic rhinitis 6/25/2014   • Coronary artery disease involving native heart with angina pectoris [facility])    • Prostate cancer ([facility]      [place] grade 4 adenocarcinoma dx in 1997, received XRT at [facility]         Past Surg PRN  •  ramipril (ALTACE) cap 5 mg, 5 mg, Oral, Daily  •  Alfuzosin HCl ER (UROXATRAL) 24 hr tab 10 mg, 10 mg, Oral, Daily with breakfast  •  Enoxaparin Sodium (LOVENOX) 40 MG/0.4ML injection 40 mg, 40 mg, Subcutaneous, Daily  •  acetaminophen (TYLENOL) ta Exam:    Blood pressure 117/62, pulse 63, temperature 97.9 °F (36.6 °C), temperature source Oral, resp. rate 18, height 5' 9\" (1.753 m), weight 178 lb (80.74 kg), SpO2 97 %.     Constitutional:  Alert and oriented, no acute distress  Heent: Normal orophary

## 2017-06-09 NOTE — CM/SW NOTE
06/09/17 1600   CM/SW Screening   Referral Source Social Work (self-referral)   Information Source (patient)   Patient's Mental Status Alert;Oriented   Patient lives with Spouse   Patient Status Prior to Admission   Independent with ADLs and Mobility Handy Ochoa

## 2017-06-09 NOTE — H&P
[facility] HOSPITAL[facility]  History and Physical     Rafia Senegal Patient Status:  Emergency    1940 MRN [id number]   Location [address] Attending Sarah Li, *   Hosp Day # 0 PCP Montse Caraballo DO     Chief Comp systolic CHF (congestive heart failure) (HCC)    • Allergic rhinitis 6/25/2014   • Coronary artery disease involving native heart with angina pectoris [facility])    • Prostate cancer ([facility]      [place] grade 4 adenocarcinoma dx in 1997, received XRT at [address] mouth every 6 (six) hours as needed for Nausea. Disp: 30 tablet Rfl: 3   Ondansetron HCl (ZOFRAN) 8 MG tablet Take 1 tablet (8 mg total) by mouth every 8 (eight) hours as needed for Nausea.  Disp: 30 tablet Rfl: 3   diphenoxylate-atropine 2.5-0.025 MG Oral SpO2 100%  General: No acute distress. Alert and oriented x 3. HEENT: Normocephalic atraumatic. Moist mucous membranes. EOM-I. PERRLA. Anicteric. Neck: No lymphadenopathy. No JVD. No carotid bruits. Respiratory: Clear to auscultation bilaterally.  No whe Full  · Dill: none    Plan of care discussed with patient, ED Physician    Jonathan Garber MD  6/8/2017

## 2017-06-09 NOTE — PROGRESS NOTES
Full consult dictated. Patient with neuroendocrine de-differentation of prostate cancer. Sp first cycle of chemo but had been having diarrhea prior to chemo. Chemo given on May 22-24 and was due for next cycle on Monday.   Due to progressive diarrhea and

## 2017-06-10 NOTE — PROGRESS NOTES
[facility] HOSPITAL[facility]  Progress Note     Eloy La Porte Patient Status:  Inpatient    1940 MRN [id number]   [facility] 4NW-A Attending Earlene Martinez, [address] [facility] Day # 2 PCP Tamanna Negron DO     Chief Complaint: diarrhea    S: Patient se Imaging: Imaging data reviewed in Epic.     Medications:   • dexamethasone  2 mg Oral BID with meals   • Potassium Chloride ER  20 mEq Oral BID   • aspirin  325 mg Oral Daily   • atorvastatin  40 mg Oral Nightly   • carvedilol  6.25 mg Oral BID with bertram

## 2017-06-10 NOTE — ANESTHESIA POSTPROCEDURE EVALUATION
Ronald Marino Patient Status:  Inpatient   Age/Gender 69year old male MRN [id number]   Location [address] [facility]. Attending Raul Bourne, [address] [facility] Day # 2 PCP Maurizio Tucker DO       Anesthesia Post-op Note    Procedure(s):

## 2017-06-10 NOTE — PROGRESS NOTES
Assumed care of pt. 12 lead EKG done, notified Dr. Sheree Joiner of results, no new orders at this time. NPO for colonoscopy this morning. IVF infusing. Informed GI lab of EKG done this morning. Scheduled for 0930 procedure.   Updated pt re: procedure schedul

## 2017-06-10 NOTE — OPERATIVE REPORT
Operative Report-Colonoscopy with snare polypectomy and cold biopsies    [place] Mast 4/23/1940   [facility] [id number] MRN [id number]   Admission Date 6/8/2017 Operation Date 6/10/2017   Attending Physician Chuck Hernández DO Operating Physician Leti Osorio, - RECTUM: Large Internal hemorrhoids, and non-thrombosed external hemorrhoids.   RECOMMENDATIONS:   - Post Colonoscopy/polypectomy precautions, watch for bleeding, infection, perforation, adverse drug reactions   - Follow biopsies.  - Repeat colonoscopy i

## 2017-06-10 NOTE — PROGRESS NOTES
Patient able to complete bowel prep. Complained of chest pain,which he described as pressure,Stat  EKG showed sinus rhythm with PVC. Dr. Erika Gilbert made aware. Troponin level drawn which was normal.  Patient had additional bowel movement which resolved chest p

## 2017-06-10 NOTE — PROGRESS NOTES
Pt returned from endoscopy. Colonoscopy done this AM.  Pt arrived awake/alert oriented. Vitals stable. No complaints at this time. Sodium restricted diet per Dr. Carmina Joel. Updated pt and family re: plan of care. Verbalizes understanding.

## 2017-06-10 NOTE — CONSULTS
[address]    PATIENT'S NAME: Tata Rome   ATTENDING PHYSICIAN: Juancarlos Neff. Jose Osborn PHYSICIAN: Felipa Carrasco M.D.    PATIENT ACCOUNT#:   [de-identified]    LOCATION:  [address] [place] [address]  MEDICAL RECORD #:   [id number]       DATE OF BIRTH: ischial tuberosity expansile lesion and other areas of focal uptake. He also had evidence of increased activity in the prostate bed. He then was started on treatment with etoposide and carboplatin; he began this on February 22.   Of note, he has had issue database. These were reviewed. ALLERGIES:  Crestor, which caused trouble breathing. SOCIAL HISTORY:  He is a retired . He drinks a moderate amount of alcohol.   His tobacco intake is minimal.    FAMILY HISTORY:  Remarkable for cancer associated with diarrhea of this degree. 2.   Metastatic neuroendocrine carcinoma. He is felt to have a high-grade dedifferentiation of his prostate cancer.   He did receive hormonal therapy not long ago, and has enough onboard to cover him until November

## 2017-06-11 NOTE — PLAN OF CARE
GASTROINTESTINAL - ADULT    • Maintains or returns to baseline bowel function Progressing        Impaired Functional Mobility    • Achieve highest/safest level of mobility/gait Progressing        PAIN - ADULT    • Verbalizes/displays adequate comfort level

## 2017-06-11 NOTE — PROGRESS NOTES
THE [facility] Hematology Oncology Group Progress Note      Patient Name: Cayetano Gordon   YOB: 1940  Medical Record Number: [id number]  Attending Physician: Chelle Soliman M.D.       SUBJECTIVE:  Kiran Born is a(n) 69year old male with metastatic Intravenous PRN   [DISCONTINUED] Naloxone HCl (NARCAN) 0.4 MG/ML injection 80 mcg 80 mcg Intravenous PRN   dexamethasone (DECADRON) tab 2 mg 2 mg Oral BID with meals   Potassium Chloride ER (K-DUR M20) CR tab 20 mEq 20 mEq Oral BID   [COMPLETED] Potassium Biopsies from colonoscopy pending and can be followed up upon as outpatient. Continue cholestyramine. Use Imodium and Lomotil PRN if loose stools return. 2.   Metastatic neuroendocrine carcinoma of prostate: Chemotherapy as outpatient.      3.   Steroid

## 2017-06-11 NOTE — PROGRESS NOTES
NURSING DISCHARGE NOTE    Discharged Home via Wheelchair. Accompanied by Spouse  Belongings Taken by patient/family. Pt dc'd aaox4, denies pain, dc instructions given to pt/spouse, verbalized understanding.

## 2017-06-11 NOTE — PROGRESS NOTES
Alert,oriented. No complaint of pain this shift. No stool reported. Up to bathroom to void. IV fluids ongoing. Resting quietly at this time.

## 2017-06-11 NOTE — PROGRESS NOTES
[facility]PITAL[facility]  Progress Note     Janet Schirmer Patient Status:  Inpatient    1940 MRN [id number]   [facility] 4NW-A Attending Lazarus Handy, [address] [facility] Day # 3 PCP Efra Chacko DO     Chief Complaint: diarrhea    S: Patient se reviewed in Epic.     Medications:   • dexamethasone  2 mg Oral BID with meals   • Potassium Chloride ER  20 mEq Oral BID   • aspirin  325 mg Oral Daily   • atorvastatin  40 mg Oral Nightly   • carvedilol  6.25 mg Oral BID with meals   • Vitamin D (Cholecal

## 2017-06-11 NOTE — PROGRESS NOTES
Gastroenterology Progress Note  Patient Name: Katharine Segal  Chief Complaint: diarrhea  S: Pt denies diarrhea.  He had one formed BM this am.   O: /64 mmHg  Pulse 72  Temp(Src) 98 °F (36.7 °C) (Oral)  Resp 18  Ht 5' 9\" (1.753 m)  Wt 185 lb 4.8 oz (84. General weakness     Hypokalemia     Diarrhea    Mr. Michelle Cervantes is a 69year-old male who is being seen in consultation for chronic diarrhea. He has a history of neuroendocrine prostate ca s/p radiation therapy and currently on chemotherapy.  Differential i

## 2017-06-11 NOTE — DISCHARGE SUMMARY
[facility] [place] HOSPITALIST  DISCHARGE SUMMARY     Sima Soler Patient Status:  Inpatient    1940 MRN [id number]   [facility] 4NW-A Attending No att. providers found   Hosp Day # 3 PCP Bonny Arce DO     Date of Admission: 2017  [facility] Colonoscopy performed and negative aside from polyps. Biopsies taken. Patient continued on lomotil, imodium, and cholestyramine. Hydrated with IVF. Patient clinically improved and discharged home with GI follow-up.  Patient followed by oncology while inpati diphenoxylate-atropine 2.5-0.025 MG Tabs   Commonly known as:  LOMOTIL        Take 1-2 tablets by mouth 4 (four) times daily as needed for Diarrhea.     Quantity:  60 tablet   Refills:  1       diphenoxylate-atropine 2.5-0.025 MG Tabs   Commonly known as [address] [facility] [address]  [phone]    On 6/15/2017  10:30 am    Riaz Vaughan DO  [address] [address]    In 2 weeks  hospital discharge for diarrhea    -------------------------------------------------

## 2017-06-13 NOTE — TELEPHONE ENCOUNTER
Patient discharged from [facility] on 6/11/17 and is at High risk for readmission and recommended to have a TCM HFU appointment by 6/18/17.   [facility] attempted to schedule TCM HFU, patient declines at this time as he is following up with Dr. Marie Belcher and going for [facility]

## 2017-06-13 NOTE — PROGRESS NOTES
Initial Post Discharge Follow Up   Discharge Date: 6/11/17  Contact Date: 6/13/2017    Consent Verification:  Assessment Completed With: Patient  HIPAA Verified?   Yes    Discharge Dx:   Chronic diarrhea, dehydration    General:   • How have you been sin 0.4 mg by mouth daily. Disp:  Rfl:    carvedilol 6.25 MG Oral Tab Take 6.25 mg by mouth 2 (two) times daily with meals. Disp:  Rfl:    Clopidogrel Bisulfate 75 MG Oral Tab Take 75 mg by mouth daily.  Disp:  Rfl:    ramipril 5 MG Oral Cap Take 5 mg by mouth [facility] in [place] ([facility])        [facility] [address]  [phone] [facility] in [place]

## 2017-06-14 NOTE — TELEPHONE ENCOUNTER
I called the patient about scheduling TCM visit (had to be done by 6/18/17)  He has diarrhea again and Dr Becky Zepeda office has prescribed him medication again (that doesn't work). He just got home from chemo.  He has it every day this week and he is gone a

## 2017-06-14 NOTE — PROGRESS NOTES
Education Record    Learner:  Patient    Disease / Diagnosis:prostate cancer    Barriers / Limitations:  None    Method:  Brief focused, printed material and  reinforcement    General Topics:  Plan of care reviewed    Outcome:  Shows understanding

## 2017-06-15 NOTE — PATIENT INSTRUCTIONS
To reach Dr Sanchez Aaron nurse during business hours, please call [phone]. After hours, including weekends, evenings, and holidays, please call the main number [phone] for emergent needs.

## 2017-06-15 NOTE — PROGRESS NOTES
Pt here for 2 week MD shoemaker/kaye and due for chemo. Pt was in hospital from 6/8-6/11. Pt feeling better since being home. Energy level is improved since last week, appetite is good. Denies pain.  Pt notes improvement in bowels, notes no stool output for 12 hours o

## 2017-06-20 PROBLEM — Z71.89 OTHER SPECIFIED COUNSELING: Status: RESOLVED | Noted: 2017-01-01 | Resolved: 2017-01-01

## 2017-06-20 PROBLEM — D64.9 ANEMIA, UNSPECIFIED: Status: RESOLVED | Noted: 2017-01-01 | Resolved: 2017-01-01

## 2017-06-20 PROBLEM — M54.14 RADICULAR PAIN OF THORACIC REGION: Status: RESOLVED | Noted: 2017-01-01 | Resolved: 2017-01-01

## 2017-06-20 PROBLEM — R31.29 MICROSCOPIC HEMATURIA: Status: RESOLVED | Noted: 2017-01-01 | Resolved: 2017-01-01

## 2017-06-20 NOTE — PROGRESS NOTES
HPI:   Kavita Alves is a 69year old male who presents for a Medicare Subsequent Annual Wellness visit (Pt already had Initial Annual Wellness).     Patient presents for his wellness exam.  He is undergoing chemotherapy and radiation for metastatic prosta mL (500,000 Units total) by mouth 4 (four) times daily. diphenoxylate-atropine (LOMOTIL) 2.5-0.025 MG Oral Tab Take 2 tablets by mouth 4 (four) times daily as needed for Diarrhea. Rosuvastatin Calcium 20 MG Oral Tab Take 20 mg by mouth daily.    Hilda colonoscopy (N/A, 6/10/2017). His family history includes Cancer in his brother, brother, and sister; Heart Disorder in his brother, brother, father, mother, and sister. SOCIAL HISTORY:   He  reports that he quit smoking about 52 years ago.  His smokin CAD in native artery  Stable    7. Hyperlipidemia, unspecified hyperlipidemia type  Controlled  - Lipid Panel [E]; Future  - Lipid Panel [E]; Future  - Lipid Panel [E]    8. Pulmonary emphysema, unspecified emphysema type ([facility] [address]  Stable    9.  Prostate cance daily physical activity?: Light (to none)    How would you describe your current health state?: Poor    How do you maintain positive mental well-being?: Visiting Friends; Visiting Family    If you are a male age 38-65 or a female age 47-67, do you take aspi Medicare Assessment section in Charting, test patient and document. Then, refresh your progress note to see your input here.   Cognitive Assessment     What day of the week is this?: Correct    What month is it?: Correct    What year is it?: Correct    R 09/19/14  -PNEUMOCOCCAL IMMUNIZATION        Hepatitis B No orders found for this or any previous visit.      Tetanus   Orders placed or performed in visit on 09/19/14  -TETANUS, DIPHTHERIA TOXOIDS AND ACELLULAR PERTUSIS VACCINE (TDAP), >7 YEARS, IM USE

## 2017-06-20 NOTE — PATIENT INSTRUCTIONS
Altagracia Ackerman SCREENING SCHEDULE   Tests on this list are recommended by your physician but may not be covered, or covered at this frequency, by your insurer. Please check with your insurance carrier before scheduling to verify coverage.     PREVENTATIVE lifetime   • Anyone with a family history    Colorectal Cancer Screening Covered up to Age 76     Colonoscopy Screen   Covered every 10 years- more often if abnormal Colonoscopy,10 Years due on 06/10/2027 Update Health Maintenance if applicable    Flex Sig Illicit injectable drug abusers     Tetanus Toxoid- Only covered with a cut with metal- TD and TDaP Not covered by Medicare Part B)   Orders placed or performed in visit on 09/19/14  -TETANUS, DIPHTHERIA TOXOIDS AND ACELLULAR PERTUSIS VACCINE (TDAP), >7

## 2017-06-30 NOTE — TELEPHONE ENCOUNTER
Spoke with patient per MD request. Hgb 7.9. Pt denies increase in fatigue or SOB. He did note having chest tightness when lying down the last few nights. Took nitro with relief. He was having diarrhea for several week however, this is now improved.   Disc

## 2017-06-30 NOTE — PROGRESS NOTES
Education Record    Learner:  Patient    Disease / Mili Salon    Barriers / Limitations:  None    Method:  Brief focused, printed material and  reinforcement    General Topics:  Plan of care reviewed    Outcome:  Shows understanding

## 2017-07-07 NOTE — PROGRESS NOTES
[facility]    PATIENT'S NAME: Claire Churchs Ferry   ATTENDING PHYSICIAN: Thien Hill M.D.    PATIENT ACCOUNT #: [de-identified] LOCATION: [address] [place] [address] RECORD #: [id number] YOB: 1940   DATE OF SERVICE: 07/06/2017       CANCER CENTER done with, carvedilol 6.25 mg b.i.d., vitamin D 400 units daily, cholestyramine 1 packet 3 times a day, clopidogrel 75 mg daily, diphenoxylate/atropine 1-2 tablets q.i.d. p.r.n., ondansetron 8 mg q.8 h. p.r.n., potassium chloride 20 mEq daily, ramipril 5 m has widespread neuroendocrine disease, the duration of response of the current treatment may not be particularly long, but I will need to try to give him the maximal chance of a good response. I will see him again 3 weeks from today.   He will be starting

## 2017-07-07 NOTE — PROGRESS NOTES
Education Record    Learner:  Patient and Spouse    Disease / Diagnosis:    Barriers / Limitations:  None   Comments:    Method:  Brief focused, Discussion and Printed material   Comments:    General Topics:  Medication, Pain, Precautions, Procedure, Side

## 2017-07-07 NOTE — PATIENT INSTRUCTIONS
For Dr. Dawit Cortez nurse line, call  [phone] with any questions or concerns Monday through Friday 8:00 to 4:30.   After hours or weekends for emergent needs [phone]    Your neulasta injector was applied on 7/7/17 @ 1007 am, you may remove this on 7/

## 2017-07-11 NOTE — TELEPHONE ENCOUNTER
Patient's wife called in saying that her  feels very weak and very wobbly. Called and spoke to the patient. Patient is on Carbo/Etoposide and has had 3 cycles of these. Last treatment days were from 7/5 thru 7/7.  Said that he started feeling very

## 2017-07-26 NOTE — PROGRESS NOTES
Education Record    Learner:  Patient    Disease / Diagnosis:prostate ca    Barriers / Limitations:  None   Comments:    Method:  Brief focused and Printed material   Comments:    General Topics:  Medication, Side effects and symptom management and Plan of

## 2017-07-26 NOTE — PATIENT INSTRUCTIONS
For Dr. Kirsten Martinez nurse line, call  [phone] with any questions or concerns Monday through Friday 8:00 to 4:30.   After hours or weekends for emergent needs [phone]

## 2017-07-27 NOTE — PROGRESS NOTES
Education Record    Learner:  Patient, Spouse and Family Member    Disease / Diagnosis: met small cell  Barriers / Limitations:  None   Comments:    Method:  Discussion   Comments:    General Topics:  Medication, Side effects and symptom management, Plan o

## 2017-07-27 NOTE — PROGRESS NOTES
Pt here for 3 week MD f/kaye and due for chemo. Energy level and appetite are good. Denies pain. Pt notes leg pain after Neulasta. Pt notes a little constipation currently. Pt had PET scan on 7/21. Pt has no further complaints.

## 2017-08-16 NOTE — PROGRESS NOTES
Education Record  Learner:  Patient  Disease / Diagnosis:   Prostate cancer  Barriers / Limitations:  None  Method:  Printed material and Reinforcement  General Topics:  Plan of care reviewed; side effects; schedule  Outcome:  Shows understanding and Patie

## 2017-08-17 NOTE — PROGRESS NOTES
Pt here for NP f/u and due for chemo. Energy level has been fair, appetite has been good. Denies pain. Pt has continue rash on feet and buttocks. No bowel issues, no nausea. Pt has no further complaints.

## 2017-08-17 NOTE — PROGRESS NOTES
Education Record  Learner:  Patient  Disease / Diagnosis:   Metastatic prostate cancer; NSCcancer, unknown primary  Barriers / Limitations:  None  Method:  Printed material and Reinforcement  General Topics:  Plan of care reviewed; schedule; side effects

## 2017-08-18 NOTE — PROGRESS NOTES
Education Record    Learner:  Patient and Spouse    Disease / Diagnosis:met prostate  Barriers / Limitations:  None   Comments:    Method:  Discussion   Comments:    General Topics:  Medication, Side effects and symptom management and Fall risk and prevent

## 2017-08-18 NOTE — PROGRESS NOTES
CC:Patient presents with: Follow - Up: pt here for NP f/u and duie for chemo  Pt here for NP f/u and due for chemo. Energy level has been fair, appetite has been good. Denies pain. Pt has continue rash on feet and buttocks. No bowel issues, no nausea.  Pt supraclavicular, axillary, or inguinal regions. Psych/Depression: normal    LABS    Results for Lia Bence (MRN [id number]) as of 8/17/2017 22:06   Ref.  Range 8/16/2017 09:30   Glucose Latest Ref Range: 70 - 99 mg/dL 70   Sodium Latest Ref Range: 136 - Immature Granulocyte Absolute Latest Ref Range: 0.00 - 1.00 x10(3) uL 0.13   Neutrophils % Latest Units: % 71.9   Lymphocytes % Latest Units: % 8.8   Monocytes % Latest Units: % 15.0   Eosinophils % Latest Units: % 1.8   Basophils % Latest Units: % 0.7

## 2017-09-07 NOTE — PROGRESS NOTES
Education Record    Learner:  Patient and spouse      Disease / Silas cruz    Barriers / Limitations:  None   Comments:    Method:  Brief focused and Printed material   Comments:    General Topics:  Medication, Side effects and symptom management an

## 2017-09-07 NOTE — PROGRESS NOTES
Patient here for D2C6 Etoposide. See toxicities. Patient and family would like to know what to expect after last treatment day and here to discuss.      Education Record    Learner:  Patient, Spouse and Family Member    Disease / Diagnosis: Prostate and Bon

## 2017-09-07 NOTE — PATIENT INSTRUCTIONS
For Dr. Grace Harley nurse line, call  [phone] with any questions or concerns Monday through Friday 8:00 to 4:30.   After hours or weekends for emergent needs [phone]

## 2017-09-08 PROBLEM — D63.0 ANEMIA COMPLICATING NEOPLASTIC DISEASE: Status: ACTIVE | Noted: 2017-01-01

## 2017-09-08 PROBLEM — B36.9 FUNGAL DERMATITIS: Status: ACTIVE | Noted: 2017-01-01

## 2017-09-09 NOTE — PROGRESS NOTES
[address]    PATIENT'S NAME: Crystal Nance   ATTENDING PHYSICIAN: Rita Rangel M.D.    PATIENT ACCOUNT #: [de-identified] LOCATION: [address] [place] [address] RECORD #: [id number] YOB: 1940   DATE OF SERVICE: 09/07/2017       CANCER Adin Lomotil p.r.n., ondansetron 8 mg q.8 h. p.r.n., potassium chloride 20 mEq daily, ramipril 5 mg daily, and rosuvastatin 20 mg at bedtime. He does have a rash on his buttocks and has been using nystatin on it, with marginal benefit.   They would like me to l disease. This probably will need to be repeated. He did have evidence of intrahepatic lesions at that time on PET scan, and again, he has had an excellent clinical response to date.   If and when he eventually progresses, we will probably have to re-biops

## 2017-09-28 NOTE — PROGRESS NOTES
Pt here for 3 week MD f/u. Pt states energy level is at about 50%, appetite is good. Denies pain. Pt notes rash on buttocks is better with ketoconazole cream, but feet still breaking out. Pt notes numbness type feeling in 2nd and 3rd toe on left foot only.

## 2017-09-30 NOTE — PROGRESS NOTES
[facility]    PATIENT'S NAME: Tata Rome   ATTENDING PHYSICIAN: Felipa Carrasco M.D.    PATIENT ACCOUNT #: [de-identified] LOCATION: [address] [place] [address] RECORD #: [id number] YOB: 1940   DATE OF SERVICE: 09/28/2017       CANCER CENTER GENERAL:  He is a well-developed, well-nourished male in no acute distress. VITAL SIGNS:  His performance status is 1. His weight is 200 pounds which is stable. Blood pressure 120/67, pulse 71, respiratory rate 20, temperature 96.8.   HEENT:  Tanvir [facility] Rajan Blanco M.D.

## 2017-10-14 NOTE — ED INITIAL ASSESSMENT (HPI)
77YM c/c of hematuria Pt state that he was dx with a UTI today Pt state this evening he was urinariae all blood tonight

## 2017-10-14 NOTE — ED PROVIDER NOTES
Patient Seen in: [facility] Emergency Department    History   Patient presents with:  Urinary Symptoms (urologic)    Stated Complaint: Hematuria    HPI    Patient is a 81-year-old male comes emergency room for evaluation hematuria.   Patient states [place]   • Pulmonary nodule 2002    bilat, extensive w/u at [facility], no evidence of malignancy, diagnosis  ABPA   • Radiation cystitis 2009    hematuria , cystoscopy Dr Remy Segura   • Radiation proctitis    • S/P CABG (coronary artery bypass lachelle Exam    GENERAL: No acute distress, well appearing and non-toxic, Alert and oriented X 3   HEENT: Normocephalic, atraumatic. Moist mucous membranes.   Pupils equal round reactive to light accommodation, extraocular motion is intact, sclerae white, conjunct course of Levaquin. Recommended he follow-up with his urologist.    Patient was screened and evaluated during this visit.    As a treating physician attending to the patient, I determined, within reasonable clinical confidence and prior to discharge, that

## 2017-10-18 NOTE — PROGRESS NOTES
Eloy Rehman is a 69year old male. Patient presents with: Other: f/up from [facility] ER for hematuria . Hiwot Hernandez ..room 2      HPI:   Patient was seen at the emergency room October 14 with complaints of gross hematuria. He was started on Levaquin.   Culture was obt 3.8 cm by CT 10/2014, 3.3 cm on CT 2009   • Allergic rhinitis 6/25/2014   • CAD (coronary artery disease)     hx of prev IWMI   • Cancer of parotid gland ([facility] [address] 2005    low grade mucoepidermoid carcinoma right parotid, underwent right parotidectomy at [facility] History:  Smoking status: Former Smoker                                                              Packs/day: 2.00      Years: 20.00        Types: Cigarettes     Quit date: 1/23/1965  Smokeless tobacco: Never Used                      Alcohol use:  No Imaging & Consults:  None

## 2017-10-20 NOTE — TELEPHONE ENCOUNTER
carvedilol 6.25 MG  ramipril 5 MG  Clopidogrel Bisulfate 75 MG   Rosuvastatin Calcium 20 MG    [facility]

## 2017-10-26 NOTE — PROGRESS NOTES
Pt here for 1 month MD shoemaker/kaye. Pt had PET 10/19. Pt's energy level is still low, only slightly improved. Appetite is good. Pt notes pain rectal area, very uncomfortable to sit. Pt also c/o pain in back, takes Norco for pain.  Pt had UTI a couple weeks ago, was

## 2017-10-27 NOTE — TELEPHONE ENCOUNTER
carvedilol, Rosuvastatin Calcium, ramipril, & Clopidogrel Bisulfate WERE SENT TO PHARMACY STOP IN ERROR ON 10/20, THEY WERE SUPPOSED TO GO TO [facility] MAIL ORDER, PLEASE CORRECT THIS, ALL NEED TO BE FOR 90 DAY SUPPLY

## 2017-10-31 NOTE — PROGRESS NOTES
Per Dr. Toni Cleary, pt to have off week cbc. Pt and family will go to [facility] to have drawn. Lab orders entered.

## 2017-10-31 NOTE — PROGRESS NOTES
Education Record    Learner:  Patient, Spouse and Family Member    Disease / Diagnosis: prostate ca    Barriers / Limitations:  None   Comments:    Method:  Discussion and Printed material   Comments:    General Topics:  Medication, Side effects and sympto

## 2017-11-02 NOTE — TELEPHONE ENCOUNTER
Patient has concerns about constipation.   Taking Stool softener, colace two times per day  Also utilized miralax, took 2 doses today  Bowels are moving, had one BM today  Encouraged them to continue both stool softners up to three x per day and miralax 2-3

## 2017-11-02 NOTE — PROGRESS NOTES
IV Chemotherapy Education    Patient:Ernie Duncan    Date:  10/31/17    Diagnosis: Metastatic small cell lung cancer    Caregivers present: spouse and daughter    Drug names:  Topotecan    Treatment Effects on Bone Marrow:  Chemotherapy action on cancer / no provided, discussed antiemetics at home. Pain script given to patient. Patient will switch to Thursday on subsequent schedules.      Haley CHEW-BC  THE [facility]

## 2017-11-07 NOTE — TELEPHONE ENCOUNTER
Per Dr Lisa Aase, pt to discontinue the Miralax, Dulcolax, stool softener and suppository for now. Pt to take Lactulose tid. Pt can decrease Lactulose to 1-2 x daily once he has a normal BM. Rx sent to the pharmacy.

## 2017-11-09 NOTE — PATIENT INSTRUCTIONS
Patient to take miralax once daily, increase to up to 3 x day as needed to maintain regular bowel movement  Patient needs to increase activity level during the day, exercise at home  May take norco for pain  Increase his fluid intake.

## 2017-11-09 NOTE — PROGRESS NOTES
CC:Patient presents with:   Follow - Up: day 8 topetecan, weakness, constipation, impaired balance      HPI:   Umer Merlin is a(n) 69year old male followed by Dr. Geovanna Boyer for history of prostate cancer with transition to small cell carcinoma with bone mets auscultation. Heart: Regular rate and rhythm. Abdomen: Soft, non tender with good bowel sounds. Extremities: Pedal pulses are present. No edema. Neurological: Grossly intact. Lymphatics:  There is no palpable lymphadenopathy throughout in the cervica smear         PET 10/19/17  FINDINGS:  Nodule in the posterior medial right upper lobe demonstrates peak SUV of 9.7 and measures 1.5 x 1.5 cm, previously 9 x 9 mm without abnormal FDG uptake.  2 nodules in the right lower lobe has a peak SUV of 5.3 and 4.5 with and increase as needed to maintain regular BM    Weakness, related to the chemotherapy, discussed importance of physical therapy and regular activity to maintain muscle strength. Would like [place] PT [place]     Pros

## 2017-11-13 NOTE — TELEPHONE ENCOUNTER
I talked with the patient, he was approved for medication support for opdivo and [place] from company sponsored program. The patient will stop topetecan and initiate new therapy. He has talked with Dr. Toni Cleary about this change.  I will see him Thursday

## 2017-11-16 NOTE — PROGRESS NOTES
Patient complaining of pain right side and left buttocks, had not taken any pain medication today, will administer dose of IV dilaudid, patient restless during treatment due to the pain.

## 2017-11-16 NOTE — PROGRESS NOTES
CC:Patient presents with:  Pt Ed: new regimen, ipilumimab and nivolumab      HPI:   Galo Hay is a(n) 69year old male followed by Dr. Isabel Ely for history of prostate cancer with transition to small cell carcinoma with bone mets.   He has had prior radiat 24 hour(s))  -COMP METABOLIC PANEL (14)   Collection Time: 11/16/17  9:19 AM   Result Value Ref Range   Glucose 85 70 - 99 mg/dL   BUN 16 8 - 20 mg/dL   Creatinine 0.88 0.70 - 1.30 mg/dL   GFR 83 >=60   Calcium, Total 8.5 8.3 - 10.3 mg/dL   Alkaline Phosph okay to proceed today with platelet count of 88.    5. Prostate cancer, small cell, patient to stop topetecan and initiate immunotherapy based on checkmate trial. Patient approved for financial assistance.     Patient instructions  Return in three weeks, so

## 2017-11-16 NOTE — PROGRESS NOTES
Chemotherapy Education    Patient: Guru Mayes  Date: 11/16/17  Barriers / Limitations:  None  Diagnosis: small cell  Caregivers present: spouse and daughter    Drug names:ipilumimab, nivolumab  Immunotherapy    Immunotherapy education goals:  · Learn the d ALT  Itching  Pneumonia  Pain  Not all side effects are listed above. Some that are rare (occurring in less than 10% of patients) are not listed here. However, you should always inform your health care provider if you experience any unusual symptoms.     A

## 2017-11-16 NOTE — PATIENT INSTRUCTIONS
To reach Dr Dawit Cortez nurse during business hours, please call [phone]. After hours, including weekends, evenings, and holidays, please call the main number [phone] for emergent needs.

## 2017-11-17 NOTE — TELEPHONE ENCOUNTER
Date of Treatment: 11/16/17                                Type of Chemo: Opdivo/[place]    Comments: Spoke with patients wife- pt sleeping- she states \"pt feels pretty rough. \" Pt c/o ongoing buttock pain and some discomfort where he received Trelstar inj

## 2017-11-21 NOTE — TELEPHONE ENCOUNTER
Patient wife called stated patient has been very fatiged since his treatment last Thursday ( received Deborra Estrella and Opdivo). Wife stated patient has had diarrhea - has not used Imodium or Lomotil which he has at home. Stated patient is eating.  No sure if he i

## 2017-11-21 NOTE — PROGRESS NOTES
[address]    PATIENT'S NAME: Belle Diamond   ATTENDING PHYSICIAN: Toni Gibson M.D.    PATIENT ACCOUNT #: [de-identified] LOCATION: [address] [place] [address] RECORD #: [id number] YOB: 1940   DATE OF SERVICE: 10/26/2017       CANCER CENTER sclerae. Pharynx without lesions. LYMPHATICS:  He has no cervical, supraclavicular, or axillary adenopathy. LUNGS:  Resonant to percussion and clear to auscultation, with no wheezing, rales, or rhonchi. HEART:  Regular S1, S2 with no murmur or gallop.

## 2017-11-24 NOTE — ED NOTES
Patient reports rash on arms and chest. MD notified.  Patient to get benadryl and will call ER MD back in few hours if worsening

## 2017-11-24 NOTE — ED PROVIDER NOTES
Patient Seen in: [facility] Emergency Department    History   Patient presents with:  Urinary Symptoms (urologic)    Stated Complaint: Patient is unable to urinate , blood coming out.      HPI    52-year-old male presents to the emergency department THE [facility]       Past Surgical History:  No date: CABG      Comment: 1990 / 2006  No date: COLONOSCOPY      Comment: normal 2014  6/10/2017: COLONOSCOPY N/A      Comment: Procedure: COLONOSCOPY;  Surgeon: Melinda Bishop DO;  Location: [address] [place] [address] URINALYSIS WITH CULTURE REFLEX       ED Course as of Nov 24 0813  ------------------------------------------------------------  Dill catheter was placed by nursing staff and promptly drained 400 cc of tere colored fluid. No clots were noted.   The blanca

## 2017-11-25 NOTE — PROGRESS NOTES
Telephone Call    Patient's wife called. He was seen in the ER with urinary obstruction and UTI. Dill placed and started on Cipro. Developed Hives. Called the pharmacy, who recommended using Levaquin that he had at home. Hives worse.       Rec:  D/C Cip

## 2017-11-27 NOTE — TELEPHONE ENCOUNTER
Spoke to patient. Has catheter in place. Urinating ok. Will call his urologist office. If not getting through, then will contact us to get in with urology here. Hives are a bit better. No fevers. Having ongoing pelvic/ back pain.   Due to see us Thurs

## 2017-11-28 NOTE — TELEPHONE ENCOUNTER
Confirmed orders per . Dexamethasone 4mg BID and Norco 2 tabs every 4 hours. Reinforced if pain becomes unbearable patient to go to ER - otherwise to call with condition update tomorrow. Patient confirmed understanding.

## 2017-11-28 NOTE — TELEPHONE ENCOUNTER
Per Dr Deanna Amos, patient to take Norco every 4 hours. Can alternate with Ibuprofen. Pt to start Dexamethasone 4 mg bid. Pt has some pills at home. Wife to call back tomorrow with an update. If pain is worse, will need to go to the ER.

## 2017-11-30 NOTE — PROGRESS NOTES
Pt here for 2 week MD shoemaker/kaye. Pt had chemo 2 weeks ago, and states he has spent much of the time in bed. Energy level is very low, appetite is fair, eating less. Pt notes pain in perineal area, hurts to sit.  Pt was in ER Fri, couldn't urinate, now has howell c

## 2017-12-01 NOTE — PROGRESS NOTES
[address]    PATIENT'S NAME: Cipriano Marques   ATTENDING PHYSICIAN: Andrew Ludwig M.D.    PATIENT ACCOUNT #: [de-identified] LOCATION: [address] [place] [address] RECORD #: [id number] YOB: 1940   DATE OF SERVICE: 11/30/2017       CANCER CENTER as allergens in his chart.   His current medications include aspirin, carvedilol, cetirizine, vitamin D, cholestyramine, clopidogrel, hydrocodone, hydromorphone, lactulose, nitroglycerin tablets sublingually p.r.n., nystatin p.r.n., ramipril 5 mg daily, ran him to use hydromorphone for breakthrough in-between. He is seeing Dr. Alis Ryan for his followthrough regarding his urinary retention next week. I will see him again next Thursday.      Dictated By Nela Silverio M.D.  d: 11/30/2017 17:34:10  t: 11/30/20

## 2017-12-04 NOTE — TELEPHONE ENCOUNTER
Pt states he is confused. He has woken up a couple of times and doesn't know where he is or what is going on. He vomited this morning. He states he is weak. His howell has been draining bloody urine all weekend. APN visit scheduled.

## 2017-12-04 NOTE — PROGRESS NOTES
Direst admit from [facility] with hematuria, weakness, confusion. Family at bedside. Oriented to unit, call light in reach. High fall risk. Gait very unsteady. Bed alarm on PT consult. Instructed to call for assist to get up.  Verbalized understanding

## 2017-12-04 NOTE — PROGRESS NOTES
CC:Patient presents with: Follow - Up: hematuria, weakness, confusion      HPI:   Saeed Díaz is a(n) 69year old male followed by Dr. 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   He has had variety or prior treatm weight 90.3 kg (199 lb), SpO2 98 %. ROS Pertinent items are noted in HPI. PE  General: Patient is alert and oriented x 3, not in acute distress. He is a bit slow with some of his responses but accurate. He feels foggy  HEENT: EOMs intact. PERRL.  Or for cystoscopy follow up with local nephrology tomorrow, this may need to get done here.     Patient instructions  Patient to be admitted to [facility]  [facility]  Discussed with Dr. Arden Martinez by phone who agrees with admission

## 2017-12-04 NOTE — H&P
EFERN HOSPITALIST  History and Physical     [facility] Patient Status:  Inpatient    1940 MRN [id number]   [facility] 4NW-A Attending Diana Gonzalez MD   [facility] Day # 0 PCP [place] DO Will     Chief Complaint: AMS, hematuria bladder causing hematuria, had plate and screws removed at [facility]   • Prostate cancer [facility])     Yi grade 4 adenocarcinoma dx in 1997, received XRT at [facility]   • Pulmonary nodule 2002    bilat, extensive w/u at [facility], no evidence of malignancy, d hours as needed for Pain. Disp: 180 tablet Rfl: 0   cetirizine 10 MG Oral Tab Take 10 mg by mouth 2 (two) times daily. Disp:  Rfl:    RaNITidine HCl 150 MG Oral Tab Take 150 mg by mouth 2 (two) times daily.  Disp:  Rfl:    nitroGLYCERIN 0.4 MG Sublingual SL Systems:   A comprehensive 14 point review of systems was completed. Pertinent positives and negatives noted in the HPI.     Physical Exam:    /55 (BP Location: Left arm)   Pulse 75   Temp 98.2 °F (36.8 °C) (Oral)   Resp 16   Ht 5' 8.5\" (1.74 m) now, resume once urology sees patient, cont beta blocker, statin  5. Essential HTN - continue home meds  6.  Hyponatremia - hypovolemic, continue ivf      Quality:  · DVT Prophylaxis: scds  · CODE status: full  · Dill: yes, placed a week ago in [address] [place] [address]

## 2017-12-05 PROBLEM — C79.31 BRAIN METASTASES (HCC): Status: ACTIVE | Noted: 2017-01-01

## 2017-12-05 PROBLEM — C79.31 SECONDARY CANCER OF BRAIN (HCC): Status: ACTIVE | Noted: 2017-01-01

## 2017-12-05 NOTE — PHYSICAL THERAPY NOTE
Attempted x2 this AM.  1st attempt pt with MD.  2nd attempt pt's catheter leaking, awaiting RN to irrigate and PCT to then clean pt up. Will f/u in PM as schedule permits or 59/4.   Pt scheduled for MRI with sedation later this PM.

## 2017-12-05 NOTE — PROGRESS NOTES
EFREN HOSPITALIST  Progress Note     Galo Hay Patient Status:  Inpatient    1940 MRN [id number]   [facility] 4NW-A Attending Alfonso Lane MD   [facility] Day # 1 PCP Mannie Boxer, DO     Chief Complaint: AMS    S: Patient still sl breakfast   • dexamethasone Sodium Phosphate  4 mg Intravenous Q6H       ASSESSMENT / PLAN:     1. Hematuria -possibly due to chronic prostate cancer versus new infection. Appreciate uro recs, maintain howell for now and cysto after MRI done  2.  Prostate ca

## 2017-12-05 NOTE — CM/SW NOTE
12/05/17 1200   CM/SW Screening   Referral Source    Information Source Chart review;Nursing rounds  (patient & dtr)   Patient's Mental Status Alert;Oriented   Patient's [address]   Patient lives with Spouse   Patient Status Elena

## 2017-12-05 NOTE — CONSULTS
[facility] RADIATION ONCOLOGY CONSULTATION    PATIENT:   João Matamoros      69year old      4/23/1940    REFERRING MD:  Dr. Bermudez Cancer    DIAGNOSIS:   Brain metastases    CC:    Brain metastases      HPI:  71-year-old man with history of prost care.    Betzaida Jiménez MD  Radiation Oncology    CC: Dr. Uzair Jarrell

## 2017-12-05 NOTE — PROGRESS NOTES
[facility]  Urology Progress Note    Dorota Santizo Patient Status:  Inpatient    1940 MRN [id number]   [facility] 4NW-A Attending Sharon Shnaks MD   [facility] Day # 1 PCP Aaron Martinez DO     Subjective:  Dorota Santizo is a(n) 65 ye

## 2017-12-05 NOTE — PROGRESS NOTES
RADIATION ONCOLOGY    Request for consultation received. Awaiting results of MRI brain to make recommendations for palliative radiation to the brain metastases.

## 2017-12-05 NOTE — PLAN OF CARE
Assumed care @ 0730. Patient alert and oriented x4. Patient c/o numbness on left 4th and 5th fingers. Patient able to move all extremities without difficulty. Patient denies headache, denies dizziness. Patien for MRI of the brain this afternoon, kept NPO.

## 2017-12-05 NOTE — PROGRESS NOTES
Received call from Radiologist CT Brain result, Dr. Aguila Kent made aware. Per MD, will communicate with Oncology about the CT scan result. Will continue to monitor. 2230: Neuro check done, see Flowsheet.   Dr. Aguila Kent seen patient in the room,  IV Dexamethaso

## 2017-12-05 NOTE — CONSULTS
[facility]  Report of Consultation    Zaria Cage Patient Status:  Inpatient    1940 MRN [id number]   [facility] 4NW-A Attending Sarah Najera MD   [facility] Day # 0 PCP Jackie Meier DO     Reason for Consultation:  Gross hematu 11/24/17 that there is not a stricture in the differential.  However there still could be some local prostate tissue regrowth or ome neurogenic issues in light of his current concern for brain mets on tonight's head CT    History:  Past Medical History: impairment     wears readers     Past Surgical History:  No date: CABG      Comment: 1990 / 2006  No date: COLONOSCOPY      Comment: normal 2014  6/10/2017: COLONOSCOPY N/A      Comment: Procedure: COLONOSCOPY;  Surgeon: Osvaldo Yu DO 20 MG tab 20 mg, 20 mg, Oral, Daily  •  [START ON 12/5/2017] Alfuzosin HCl ER (UROXATRAL) 24 hr tab 10 mg, 10 mg, Oral, Daily with breakfast  •  [START ON 12/5/2017] dexamethasone Sodium Phosphate (DECADRON) 4 MG/ML injection 4 mg, 4 mg, Intravenous, Q6H 12/04/2017   RENUR None Seen 12/04/2017   TRANSUR None Seen 12/04/2017   MUCUR 2+ (A) 11/24/2017   YEASTUR None Seen 12/04/2017   NSEUR Small (A) 12/04/2017         Lab Results  Component Value Date   URINECUL No Growth at 18-24 hrs. 11/24/2017   URINECUL fat containing bilateral inguinal hernias, left greater than right. Changes of previous right herniorrhaphy. Surgical clips along the groins. PELVIC ORGANS:  Prostate calcifications. Mild diffuse bladder wall thickening.     PSA:    Lab Results  Component evaluationin the office. Sounds like he had an appointment ?tomorrow with his local uro (Dr Pato Mitchell) which will ted to be postponed. I am willing to assist him out this way if she would like to choose follow up here .  His urine culture from this admission

## 2017-12-05 NOTE — CM/SW NOTE
PT recommending acute rehab, order placed for physiatry. Referral sent to Carlos Gordon via [facility]. / to remain available for support and/or discharge planning.      Leonora Dial RN, [facility]/CM  [phone]

## 2017-12-05 NOTE — PROGRESS NOTES
Full consult dictated. 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 Has extensive bone mets. Recently started on immunotherapy (third line) with nivo and Ipi. Got one dose.   Had pain flare which jose

## 2017-12-05 NOTE — PHYSICAL THERAPY NOTE
PHYSICAL THERAPY EVALUATION - INPATIENT     Room Number: 404/404-A  Evaluation Date: 12/5/2017  Type of Evaluation: Initial  Physician Order: PT Eval and Treat    Presenting Problem: AMS  Reason for Therapy: Mobility Dysfunction and Discharge Planning [facility])     Yi grade 4 adenocarcinoma dx in 1997, received XRT at [place], [place]   • Pulmonary emphysema [facility])    • Pulmonary nodule 2002    bilat, [facility], no evidence of malignancy, diagnosis  ABPA   • Radiation cystitis 2009    hematur Sitting: Fair -  Dynamic Sitting: Fair -  Static Standing: Fair -  Dynamic Standing: Poor +    ADDITIONAL TESTS  Additional Tests: Elderly Mobility Scale     Elderly Mobility Scale: 5/20                           NEUROLOGICAL FINDINGS addressed; Discussed recommendations with /    ASSESSMENT   Patient is a 69year old male admitted on 12/4/2017 for AMS. Pertinent comorbidities and personal factors impacting therapy include prostate cancer.   In this PT evaluation Goals established on 12/5/2017

## 2017-12-05 NOTE — CONSULTS
[facility]    PATIENT'S NAME: Felisha [facility]   ATTENDING PHYSICIAN: Rian Moritz, MD   CONSULTING PHYSICIAN: Meri Danielson M.D.    PATIENT ACCOUNT#:   [de-identified]    LOCATION:  [address] [place] [address]  MEDICAL RECORD #:   [id number]       DATE OF BIRTH:  [birth year] lancinating pain in the palm of his left hand occurring 2 to 3 times over the course of a week. This was gone by the time I had seen him last Thursday. They called yesterday with worsening weakness.   He had difficulty with confusion, instability, and as therapy. He had a biochemical relapse. He had multiple intraosseous metastases noted over the summer of this year. He has been treated with etoposide and carboplatin, Topotecan and now nivolumab and ipilimumab with a single dose.   He has a history of co pink conjunctivae, anicteric sclerae. Pharynx without lesions. LYMPHATICS:  No cervical, supraclavicular, or axillary adenopathy. LUNGS:  Resonant to percussion and clear to auscultation. No wheezing, rales, or rhonchi.    HEART:  Regular S1 and S2 with controlling the disease for any significant period of time is very low.     Dictated By Delfina Knutson M.D.  d: 12/05/2017 07:12:20  t: 12/05/2017 08:46:58  [facility] [id number]  /    cc: MD Nela Martinez D.O.

## 2017-12-05 NOTE — OCCUPATIONAL THERAPY NOTE
OCCUPATIONAL THERAPY EVALUATION - INPATIENT     Room Number: 404/404-A  Evaluation Date: 12/5/2017  Type of Evaluation: Initial  Presenting Problem: L cerebellar lesions    Physician Order: IP Consult to Occupational Therapy  Reason for Therapy: ADL/IADL D • Personal history of antineoplastic chemotherapy 11/2017    started may 2017   • Prostate cancer [facility])     Williams grade 4 adenocarcinoma dx in 1997, received XRT at [place], [place]   • Pulmonary emphysema ([address]    • Pulmonary nodule 2002    franchesca, exteulalio consistently  Safety Judgement:  decreased awareness of need for assistance and decreased awareness of need for safety  Awareness of Errors:  assistance required to identify errors made and assistance required to correct errors made  Problem Solving:  miguel Supervision  Sit to Stand: Minimum assistance    Skilled Therapy Provided: Pt seen supine. Completed UE and oculomotor testing. SBA to EOB. Dons shoes with min (A) for sitting balance. Sit to stand min (A) via RW, cueing for hand placement.  Ambulates into Specific performance deficits impacting engagement in ADL/IADL HIGH  5+ performance deficits    Client Assessment/Performance Deficits HIGH - Comorbidities and significant modifications of tasks    Clinical Decision Making HIGH - Analysis of occupationa
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
## 2017-12-06 NOTE — PROGRESS NOTES
Heme/Onc Progress Note    Patient Name: João Matamoros   YOB: 1940   Medical Record Number: [id number]   CSN: [id number]   Attending Physician: Lara Guardado M.D. Subjective:  He was more agitated last night - perhaps ?steroid related? 10 mg, 10 mg, Oral, Daily with breakfast  •  dexamethasone Sodium Phosphate (DECADRON) 4 MG/ML injection 4 mg, 4 mg, Intravenous, Q6H    Physical Examination:  General: Patient is alert and oriented x 3, not in acute distress.   Vital Signs: /61 (BP L gadolinium-based contrast was injected intravenously. FINDINGS:    INTRACRANIAL:  Multiple enhancing intraparenchymal mass is consistent with cerebral metastases.  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 This is on ?temporary hold given brain situation    2)  Multiple yg mets.   At least 6 are e

## 2017-12-06 NOTE — PLAN OF CARE
Patient alert and oriented x4 with some forgetfulness. Patient denies pain this morning. Patient with numbness/tingling to left hand. Patient with MRI yesterday showing new brain mets. Patient is on IV decadron.  Patient with howell catheter draining tea col

## 2017-12-06 NOTE — PROGRESS NOTES
[facility]  Urology Progress Note    Gabi Toledo Patient Status:  Inpatient    1940 MRN [id number]   [facility] 4NW-A Attending [facility] Day # 2 PCP Krystle Hoang, DO     Subjective:  Gabi Toledo is a AM

## 2017-12-06 NOTE — PROGRESS NOTES
[facility]    PATIENT'S NAME: Isaac Obrien   ATTENDING PHYSICIAN: Nela Silverio M.D.    PATIENT ACCOUNT #: [de-identified] LOCATION: [address] [place] [address] RECORD #: [id number] YOB: 1940   DATE OF SERVICE: 07/27/2017       CANCER CENTER is intact. LABORATORY DATA:  White count is 9.5, hemoglobin 9.4, platelets are 671. His chemistries are essentially normal with the exception of alkaline phosphatase of 127. PSA remains virtually undetectable at 0.075.     IMPRESSION:  Neuroendocrine c

## 2017-12-06 NOTE — CM/SW NOTE
CM following up with patient regarding PT recommendation of acute rehab. Per Dr. Hollis Butcher patient not appropriate for acute rehab. Patient stating that \" he only has a little time left\" and did not want to go to rehab.  [facility]  who will hol

## 2017-12-06 NOTE — ANESTHESIA POSTPROCEDURE EVALUATION
Ronald Marino Patient Status:  Inpatient   Age/Gender 69year old male MRN [id number]   [facility] 4NW-A Attending Anali Saenz MD   [facility] Day # 1 PCP Krystle Hoang, DO       Anesthesia Post-op Note    MRI Brain    Patien

## 2017-12-06 NOTE — PHYSICAL THERAPY NOTE
PHYSICAL THERAPY TREATMENT NOTE - INPATIENT    Room Number: 714/941-S     Session: 1   Number of Visits to Meet Established Goals: 5    Presenting Problem: AMS     History related to current admission: Pt was admitted from home on 12/4/2017 with AMS.  Pt h Pulmonary emphysema ([address]    • Pulmonary nodule 2002    bilat, extensive w/u at [facility], no evidence of malignancy, diagnosis  ABPA   • Radiation cystitis 2009    hematuria , cystoscopy Dr Jacinta French   • Radiation proctitis    • S/P CABG (coronary artery Score:  Raw Score: 17   PT Approx Degree of Impairment Score: 50.57%   Standardized Score (AM-PAC Scale): 42.13   CMS Modifier (G-Code): CK    FUNCTIONAL ABILITY STATUS  Gait Assessment   Gait Assistance: Minimum assistance  Distance (ft): 150  Assistive D training;Balance training  Rehab Potential : Good  Frequency (Obs): 5x/week    CURRENT GOALS      Goal #1 Patient is able to demonstrate supine - sit EOB @ level: modified independent   Goal #2 Patient is able to demonstrate transfers Sit to/from Stand at

## 2017-12-06 NOTE — TREATMENT PLAN
Oncology    Cased reviewed with Radiation Oncology - Dr Emanuel Lyons. Plan will be to start WBRT on Monday at the [facility]. Will coordinate visit between RT and patient once discharge set.     Luci RAMIREZ-BC  Nurse Practitioner  [facility]

## 2017-12-06 NOTE — PROGRESS NOTES
EFREN HOSPITALIST  Progress Note     Christiano Caruso Patient Status:  Inpatient    1940 MRN [id number]   [facility] 4NW-A Attending [facility] Day # 2 PCP Alfonzo Grimes DO     Chief Complaint: AMS    S: Patient Epic.    Medications:   • ALPRAZolam  0.25 mg Oral BID   • cefTRIAXone  1 g Intravenous Q24H   • carvedilol  6.25 mg Oral BID with meals   • cetirizine  10 mg Oral BID   • nystatin  5 mL Oral QID   • ramipril  5 mg Oral Daily   • famoTIDine  20 mg Oral BID

## 2017-12-06 NOTE — PLAN OF CARE
GENITOURINARY - ADULT    • Absence of urinary retention Progressing        MUSCULOSKELETAL - ADULT    • Return mobility to safest level of function Progressing        NEUROLOGICAL - ADULT    • Achieves stable or improved neurological status Progressing

## 2017-12-07 NOTE — PROGRESS NOTES
[facility]  Urology Progress Note    Christiano Caruso Patient Status:  Inpatient    1940 MRN [id number]   [facility] 4NW-A Attending Jhoan Avelar [facility] Day # 3 PCP Alfonzo Grimes DO     Subjective:  Christiano Caruso is a

## 2017-12-07 NOTE — PLAN OF CARE
CARDIOVASCULAR - ADULT    • Maintains optimal cardiac output and hemodynamic stability Progressing    • Absence of cardiac arrhythmias or at baseline Progressing        GENITOURINARY - ADULT    • Absence of urinary retention Progressing        HEMATOLOGIC

## 2017-12-07 NOTE — PLAN OF CARE
Assumed care @ 0730. Patient c/o moderate pain on middle of chest @ 0840, rates pain 6 out of 10. Respirations non-labored. Nitro 1 tab sublingual given @ 0843, O2 2l/nc applied, EKG done, placed on telemetry monitoring.  Dr. Carmelo Mark notifed regarding mario

## 2017-12-07 NOTE — CM/SW NOTE
Followed up with patient and wife regarding discharge needs. Both patient and wife stating that patient will return home. Discussed HH with PT/OT and patient and wife decline at this time.  CM encouraged pt if changes mind once home can contact PCP who will

## 2017-12-07 NOTE — PROGRESS NOTES
Heme/Onc Progress Note    Patient Name: Nadya Ross   YOB: 1940   Medical Record Number: [id number]   CSN: [id number]   Attending Physician: Pina Dee M.D. Subjective:  Feels better. Moved with assist yesterday and did ok.   Fuentes (PEPCID) tab 20 mg, 20 mg, Oral, BID  •  Rosuvastatin Calcium (CRESTOR) 20 MG tab 20 mg, 20 mg, Oral, Daily  •  Alfuzosin HCl ER (UROXATRAL) 24 hr tab 10 mg, 10 mg, Oral, Daily with breakfast    Physical Examination:  General: Patient is alert and oriented necessary. May be ready for discharge later today.     Wanda Rincon MD  [facility]  [address]

## 2017-12-07 NOTE — PHYSICAL THERAPY NOTE
PHYSICAL THERAPY TREATMENT NOTE - INPATIENT    Room Number: 127/590-S     Session: 2   Number of Visits to Meet Established Goals: 5    Presenting Problem: AMS     History related to current admission: Pt was admitted from home on 12/4/2017 with AMS.  Pt h Pulmonary emphysema ([address]    • Pulmonary nodule 2002    bilat, extensive [facility], no evidence of malignancy, diagnosis  ABPA   • Radiation cystitis 2009    hematuria , cystoscopy Dr Jerel Riggs   • Radiation proctitis    • S/P CABG (coronary artery Score:  Raw Score: 17   PT Approx Degree of Impairment Score: 50.57%   Standardized Score (AM-PAC Scale): 42.13   CMS Modifier (G-Code): CK    FUNCTIONAL ABILITY STATUS  Gait Assessment   Gait Assistance: Minimum assistance  Distance (ft): 150  Assistive D Discharge Recommendations: 24 hour care/supervision;Home with home health PT     PLAN  PT Treatment Plan: Bed mobility; Body mechanics; Endurance; Energy conservation;Patient education; Family education;Gait training;Strengthening;Stair training;Transfer train

## 2017-12-07 NOTE — OCCUPATIONAL THERAPY NOTE
OCCUPATIONAL THERAPY TREATMENT NOTE - INPATIENT     Room Number: 588/177-P  Session: 1   Number of Visits to Meet Established Goals: 5    Presenting Problem: L cerebellar lesions    History related to current admission: Pt admitted 12/4/2017 for HEMATURIA Yi grade 4 adenocarcinoma dx in 1997, received XRT at [place], [place]   • Pulmonary emphysema [facility])    • Pulmonary nodule 2002    bilat, extensive w/u at [facility], no evidence of malignancy, diagnosis  ABPA   • Radiation cystitis 2009    hematuria , cysto seen supine, family present. Supine to sit supervision. LB dressing min (A) for fine motor due to LUE coordination impairment. Sit to stand CGA via RW. Cueing needed for awareness of L hand positioning throughout session.  Pt ambulates in lowe with CGA and supervision-ongoing     Functional Transfer Goals  Patient will perform all functional transfers:  with supervision-ongoing     UE Exercise Program Goal  Patient will be supervision with bilateral AROM HEP (home exercise program). -ongoing     Additional Go

## 2017-12-08 NOTE — PROGRESS NOTES
Nursing Re- Consult Note    Patient: Vira Robledo  YOB: 1940  Age: 69year old  Cora Sorenson MD  Referring Physician: Dr. Arianne Hall  Diagnosis: Prostates with bone and brain mets  Consult Date: 12/8/2017 Negative. Neurological: Positive for focal weakness and weakness. Endo/Heme/Allergies: Negative. Psychiatric/Behavioral: Negative.         Medications and Allergies review by RN: yes

## 2017-12-08 NOTE — DISCHARGE SUMMARY
[facility] HOSPITALIST  DISCHARGE SUMMARY     Christiano Caruso Patient Status:  Inpatient    1940 MRN [id number]   [facility] 4NW-A Attending No att. providers found   Hosp Day # 3 PCP Alfonzo Grimes DO     Date of Admission: 2017  Da swelling rashes chest pain shortness of breath syncope or trauma. Patient was admitted to the medical oncology floor patient had CT scan and MRI which showed 2 metastatic lesions with surrounding cerebral edema. Patient was started on IV steroids.   Marco Antonio Quantity:  90 tablet  Refills:  0     ECOTRIN 325 MG Tbec  Generic drug:  aspirin      Take 325 mg by mouth daily.    Refills:  0     HYDROcodone-acetaminophen 5-325 MG Tabs  Commonly known as:  NORCO      Take 1-2 tablets by mouth every 8 (eight) hours as [address] [address] [address]  [phone]      urology follow-up for office cystoscopy    Brian Bolton MD  [address]  [phone]      as instructed      Vital signs:       Physical

## 2017-12-09 NOTE — PROGRESS NOTES
[address] [place]    PATIENT'S NAME: Lola Albert   RADIATION ONCOLOGIST: Latoya Bailey M.D.    PATIENT ACCOUNT #: [de-identified] [facility]   MEDICAL RECORD #: [id number] YOB: 1940   FOLLOW-UP DATE: 12/08/2017       RADIATION is currently on Decadron 4 mg q.i.d. He was discharged from the hospital yesterday. He does take 2 tablets of Norco 5/325 every 4 to 6 hours and 10 mg at night. He has not initiated Dilaudid. He has constant pain to the left buttock.   He also has pain face-to-face with the patient and his family. More than 50% of that time was spent counseling the patient and on coordination of care. The diagnosis, prognosis, recommended treatment, and followup were discussed in detail. He would like to proceed.   He

## 2017-12-11 NOTE — TELEPHONE ENCOUNTER
Calling to schedule a post-hospital f/u with Dr. Timothy Goldberg. Message sent to scheduling to call patient and schedule.

## 2017-12-11 NOTE — PROGRESS NOTES
[facility] Management Note 1-5    Patient:  Sharan Mittal  Age:  69year old  Visit Diagnosis:    1. Brain metastases ([facility]    2.  Secondary cancer of bone ([facility]      Primary Rad/Onc:  Dr. Le Buerger    Site

## 2017-12-15 NOTE — PATIENT INSTRUCTIONS
POST-RADIATION INSTRUCTIONS:   -FOLLOW-UP WITH DR. Jeanne Grmies AS NEEDED/RECOMMENDED   - FOLLOW-UP WITH DR. DIAZ ON DEC.  28 AT 9:45 AM  - SIDE EFFECTS OF RADIATION WILL GRADUALLY SUBSIDE, IT MAY TAKE UP TO 2 WEEKS POST-RADIATION FOR YOU TO NOTICE CHANGES;

## 2017-12-18 NOTE — PROGRESS NOTES
[facility] Radiation Treatment Management Note 6-10    Patient:  Fam Rehman  Age:  69year old  Visit Diagnosis:    1.  Brain metastases ([address]      Primary Rad/Onc:  Dr. Мария Lopez    Site Delivered Dose (Gy) Prescribed Dose

## 2017-12-20 NOTE — PROGRESS NOTES
Faxed palliative care order and documentation to [facility], [phone] (P: [phone]). Left VM at patient's home to advise.

## 2017-12-20 NOTE — TELEPHONE ENCOUNTER
Received call from Cinthya yesterday during his RT appointment stating patient having  A lot of pain despite pain medication. Called patient to discuss pain control options and palliative care.   Patient lives an hour away and it's difficult for him to trave

## 2017-12-21 NOTE — PROGRESS NOTES
Pt here for 3 week MD f/u. Pt's energy level is \"zero\", appetite is poor, eating very little, not drinking much fluids. Pt using Dilaudid 4mg every 3 hours ATC, hasn't needed to add in Norco at this point for breakthrough pain.  Pt has productive cough, s

## 2017-12-21 NOTE — PROGRESS NOTES
Education Record    Learner:  Patient and family    Disease / Diagnosis:prostate ca    Barriers / Limitations:  None   Comments:    Method:  Brief focused and Printed material   Comments:    General Topics: Plan of care reviewed   Comments:    Outcome:   Sh

## 2017-12-21 NOTE — PATIENT INSTRUCTIONS
POST-RADIATION INSTRUCTIONS:   - FOLLOW-UP WITH DR. Jhoana Foley AS NEEDED/RECOMMENDED   - FOLLOW-UP WITH DR. DIAZ AS RECOMMENDED  - CONTINUE TAKING YOUR STEROID MEDICATIONS (DECADRON 4MG 4X/DAY) AND YOUR PAIN MEDICATIONS AS DIRECTED BY JAYDA GOLDSMITH

## 2017-12-21 NOTE — PROGRESS NOTES
EMMY spoke to [address] [facility] who advises their Palliative Care [facility] will not be able to see the patient until after January 1.  EMMY advised Angela Ramirez who discussed with MD.  Masha Romo will see him next week, and a January visit from [facility]

## 2017-12-22 PROBLEM — C79.31 BRAIN METASTASES (HCC): Status: ACTIVE | Noted: 2017-01-01

## 2017-12-22 NOTE — PROGRESS NOTES
[facility]    PATIENT'S NAME: Jose Melo   ATTENDING PHYSICIAN: Christiane Shaikh M.D.    PATIENT ACCOUNT #: [de-identified] LOCATION: [address] [place] [address] RECORD #: [id number] YOB: 1940   DATE OF SERVICE: 12/21/2017       CANCER CENTER nystatin suspension 5 mL 4 times a day; Ramipril 5 mg daily; rosuvastatin 20 mg daily; tamsulosin 0.4 mg daily. PHYSICAL EXAMINATION:    GENERAL:  He is a chronically ill appearing male, he is in no acute distress.   VITAL SIGNS:  Performance status is 3 week in case they need to bring him in for further pain management issues. I will plan on seeing him next Thursday, and likely we will have to initiate a hospice referral after that. He already is DNR.     Dictated By Schuyler Min M.D.  d: 12/22/2017

## 2017-12-26 NOTE — TELEPHONE ENCOUNTER
Patient had severe pain on Friday night and was taken to [facility] ER and was started on Fentanyl 25 mcg patch and his pain is much better and he is tolerating the medication well. They have an appointment on Thursday.

## 2017-12-27 NOTE — PROGRESS NOTES
EMMY received a telephone call from Dr Dale Grimm [place] nurse. Our patient's wife is upset because she can no longer take care of her  by herself. She needs help. SW contacted [facility] who we had done a referral to 12-.  They cannot

## 2017-12-28 NOTE — PROGRESS NOTES
Pt here for 1 week MD f/kaye. Energy level is low, appetite is low, eating less. Pt was in ER and admitted for 2 nights at [facility] last Friday for pain control, is now on Fentanyl patch.      Education Record    Learner:  Patient    Disease / Diagnosis:

## 2017-12-29 NOTE — PROGRESS NOTES
[address] [place]    PATIENT'S NAME: Radha Zapata   ATTENDING PHYSICIAN: Niki Kim M.D.    PATIENT ACCOUNT #: [de-identified] LOCATION: [address] [place] [address] RECORD #: [id number] YOB: 1940   DATE OF SERVICE: 12/28/2017       CANCER CENTER 4 mg q.4 h. p.r.n., nitroglycerin sublingual p.r.n., nystatin 500,000 units swish and swallow q.i.d., ramipril 5 mg daily, rosuvastatin 20 mg daily, and tamsulosin 0.4 mg daily. PHYSICAL EXAMINATION:    GENERAL:  He is a chronically ill-appearing male. 97:76:93  Job [id number]  /    cc: LILIAM Bui M.D. Augustus Herb, M.D.

## 2018-01-01 ENCOUNTER — TELEPHONE (OUTPATIENT)
Dept: FAMILY MEDICINE CLINIC | Facility: CLINIC | Age: 78
End: 2018-01-01

## 2018-01-01 ENCOUNTER — MEDICAL CORRESPONDENCE (OUTPATIENT)
Dept: RADIATION ONCOLOGY | Age: 78
End: 2018-01-01

## 2018-01-01 ENCOUNTER — PATIENT OUTREACH (OUTPATIENT)
Dept: FAMILY MEDICINE CLINIC | Facility: CLINIC | Age: 78
End: 2018-01-01

## 2018-01-01 ENCOUNTER — MED REC SCAN ONLY (OUTPATIENT)
Dept: FAMILY MEDICINE CLINIC | Facility: CLINIC | Age: 78
End: 2018-01-01

## 2018-01-01 DIAGNOSIS — G47.01 INSOMNIA DUE TO MEDICAL CONDITION: ICD-10-CM

## 2018-01-01 DIAGNOSIS — N34.2 URETHRITIS: Primary | ICD-10-CM

## 2018-01-01 DIAGNOSIS — C79.31 BRAIN METASTASES (HCC): ICD-10-CM

## 2018-01-01 DIAGNOSIS — C61 PROSTATE CANCER (HCC): ICD-10-CM

## 2018-01-01 DIAGNOSIS — N30.91 HEMORRHAGIC CYSTITIS: ICD-10-CM

## 2018-01-01 DIAGNOSIS — Z96.0 INDWELLING URETHRAL CATHETER PRESENT: ICD-10-CM

## 2018-01-01 DIAGNOSIS — Z51.5 HOSPICE CARE PATIENT: Primary | ICD-10-CM

## 2018-01-01 RX ORDER — NITROFURANTOIN MACROCRYSTALS 100 MG/1
100 CAPSULE ORAL 2 TIMES DAILY
Qty: 20 CAPSULE | Refills: 0 | Status: SHIPPED | OUTPATIENT
Start: 2018-01-01 | End: 2018-01-01

## 2018-01-01 RX ORDER — FENTANYL 50 UG/H
PATCH TRANSDERMAL
Refills: 0 | COMMUNITY
Start: 2018-01-01 | End: 2018-01-01 | Stop reason: DRUGHIGH

## 2018-01-01 RX ORDER — HYDROMORPHONE HYDROCHLORIDE 2 MG/1
TABLET ORAL EVERY 2 HOUR PRN
Qty: 180 TABLET | Refills: 0 | Status: SHIPPED | OUTPATIENT
Start: 2018-01-01

## 2018-01-01 RX ORDER — FENTANYL 75 UG/H
1 PATCH TRANSDERMAL
Qty: 10 PATCH | Refills: 0 | Status: SHIPPED | OUTPATIENT
Start: 2018-01-01 | End: 2018-02-21

## 2018-01-01 RX ORDER — HALOPERIDOL 2 MG/ML
SOLUTION ORAL
Qty: 15 ML | Refills: 0 | Status: SHIPPED | OUTPATIENT
Start: 2018-01-01

## 2018-01-01 RX ORDER — TRAZODONE HYDROCHLORIDE 100 MG/1
100 TABLET ORAL NIGHTLY
Refills: 0 | COMMUNITY
Start: 2018-01-01

## 2018-01-04 NOTE — PROGRESS NOTES
RADIATION ONCOLOGY TREATMENT SUMMARY         [address] [facility] Location: [facility]   Date of Birth   4/23/1940 Radiation Oncologist     Jessi Escobedo MD, Jessica Mackenzie MD: WB,Ischium,Rib    Treatment Site Energy Dose/Fx (Gy) #Fx Total Dose (Gy) Start Date End Date Elapsed Days   Whole Brain 6X 3 10 / 10 30 12/11/2017 12/22/2017 11   L Ischium 18X/6X 3 10 / 10 30 12/11/2017 12/22/2017 11   R Rib 9th 18X/6X 8 1 / 1 8 12/22/201

## 2018-01-17 PROBLEM — Z51.5 HOSPICE CARE PATIENT: Status: ACTIVE | Noted: 2018-01-01

## 2018-01-17 NOTE — PROGRESS NOTES
Hospice nurse notifies me that the patient is having issue voiding, Dill was placed with 500 cc red cloudy  foul urine. Will treat patient for hemorrhagic cystitis.

## 2018-02-10 NOTE — TELEPHONE ENCOUNTER
Zaria Cage is a 69year old male. CC:  Patient presents with:  Condition Update: will be admitted to [facility] for respite care on hospice      HPI:  Patient has been admitted to [facility] hospice for hospice care.     Patient has stage IV pros carcinoma right parotid, underwent right parotidectomy at [facility]   • Chronic systolic CHF (congestive heart failure) ([facility]    • Coronary artery disease involving native heart with angina pectoris [facility])    • Coronary atherosclerosis    • Exposure to radiation prostate cancer   • Heart Disorder Brother    • Heart Disorder Brother    • Cancer Brother      prostate cancer   • Heart Disorder Father    • Heart Disorder Mother         Relation Status Comments   Sis     [address] normocephalic,  CHEST: no chest tenderness  LUNGS: clear to auscultation, diminished breath sounds  CARDIO: RRR without murmur  GI: good BS's,no current tenderness, soft  MUSCULOSKELETAL:generalized cachexia and wasting    Dill in place  Cloudy tea colo

## 2022-12-18 NOTE — PROGRESS NOTES
[facility] HOSPITALIST  Progress Note     Katharine Hides Patient Status:  Inpatient    1940 MRN [id number]   [facility] 4NW-A Attending David VergaraELIJAH [facility] Day # 3 PCP Meryle Cullens, DO     Chief Complaint: AMS    S: Patient mg/dL). No results for input(s): PTP, INR in the last 72 hours. Recent Labs   Lab  12/07/17   0904   TROP  <0.046            Imaging: Imaging data reviewed in Epic.     Medications:   • dexamethasone  4 mg Oral QID   • ALPRAZolam  0.25 mg Oral BID   • Statement Selected

## 2023-11-15 NOTE — TELEPHONE ENCOUNTER
Chief Complaint  Laceration (Both hands/Poss infection//Cuts for a week, painting brick with limewash)    HPI:    Memo Swan presents to [facility]    Patient is a 37-year-old male with a history of hypertension, hyperlipidemia, ADD, bipolar presenting for evaluation of hand laceration and possible infection.    Patient recently was working with lyme wash to change the color of his brick. He reports that he was wearing gloves but it did drip down into the gloves causing irritation of the skin. He worked with the material several days ago and then developed open wounds that are painful and associated with skin break down. He has been treating with soap/water and occasional peroxide. Denies erythema, swelling, warmth, discharge.  Denies fever, chills, nausea, vomiting.  Area of skin damage not present anywhere else besides on the hands exposed to Lyme.  Denies any constitutional or systemic symptoms.    Review of Systems:  ROS negative unless otherwise noted in HPI above.    Past Medical History:   Diagnosis Date    ADHD (attention deficit hyperactivity disorder)     Hyperlipidemia     Hypertension          Current Outpatient Medications:     amphetamine-dextroamphetamine (ADDERALL) 10 MG tablet, Take 2 tablets by mouth 2 (Two) Times a Day., Disp: , Rfl:     hydrOXYzine (ATARAX) 50 MG tablet, Take 1 tablet by mouth Every 4 (Four) Hours As Needed., Disp: , Rfl:     lithium carbonate 300 MG capsule, Take 1 capsule by mouth 3 (Three) Times a Day With Meals., Disp: , Rfl:     ALPRAZolam (XANAX) 0.5 MG tablet, Take 1 tablet by mouth Every 12 (Twelve) Hours As Needed for Anxiety or Sleep for up to 30 doses. (Patient not taking: Reported on 11/15/2023), Disp: 30 tablet, Rfl: 1    divalproex (DEPAKOTE) 250 MG DR tablet, 1 tablet Daily. (Patient not taking: Reported on 11/15/2023), Disp: , Rfl:     Social History     Socioeconomic History    Marital status:    Tobacco Use     Date of Treatment: 10/3/17                                Type of Chemo: Topotecan    Comments: attempted to call patient at listed home number. Voicemail not set up per recording. Recommendations: Will attempt to call patient tomorrow to f/u. "Smoking status: Never    Smokeless tobacco: Former     Types: Snuff   Vaping Use    Vaping Use: Never used   Substance and Sexual Activity    Alcohol use: Not Currently     Comment: occ    Drug use: Not Currently    Sexual activity: Yes     Partners: Female        Objective   Vital Signs:  /58   Pulse 61   Resp 17   Ht 190.5 cm (75\")   Wt 78.3 kg (172 lb 9.6 oz)   SpO2 99%   BMI 21.57 kg/m²   Estimated body mass index is 21.57 kg/m² as calculated from the following:    Height as of this encounter: 190.5 cm (75\").    Weight as of this encounter: 78.3 kg (172 lb 9.6 oz).    Physical Exam:  General: Well-appearing patient, no apparent distress  Skin: Caustic burns on multiple fingers of the right hand with small areas of open wound.  No significant erythema, swelling, warmth, or discharge.  MSK: Grossly normal tone and strength, 5 out of 5 strength in upper extremities bilaterally  Neuro: Alert and oriented x3, CN II-XII grossly intact, normal sensation in upper extremities bilaterally  Psych: Appropriate mood and affect    Assessment and Plan:    (T30.4) Caustic burn of skin   Assessment: Patient with caustic burn of the skin, especially of the right hand, secondary to recent lime wash.  Area appears to be healing without evidence of infection warranting antibiotics.  No sensory or motor deficits.  Discussed treatment, expected clinical course, and signs and symptoms which should prompt reevaluation.  Plan:  - Do not use peroxide  - Mild, soap and water to clean hands  - Makes sure hands clean and dry  - Use moisturizing cream (Cetaphil, Aquaphor) under occlusion  - Hold on antibiotics for now  - Monitor for infection (redness, swelling, warmth)  - Follow up if new or worsening symptoms      Patient was given instructions and counseling regarding his condition or for health maintenance advice. Please see specific information pulled into the AVS if appropriate.       Dr Guevara Maciel   Internal Medicine " Physician  [facility]  [address], [address]  [place] [address]

## 2024-05-26 NOTE — TELEPHONE ENCOUNTER
Attempted to contact pt to introduce and inform pt about CCM program no answer. Will try again in couple days. Pt presents c/o right face pain and right ankle pain s/p Thursday. Pt denies any LOC, and AC

## (undated) DEVICE — FILTERLINE NASAL ADULT O2/CO2

## (undated) DEVICE — 1200CC GUARDIAN II: Brand: GUARDIAN

## (undated) DEVICE — Device: Brand: DEFENDO AIR/WATER/SUCTION AND BIOPSY VALVE

## (undated) DEVICE — FORCEP BIOPSY RJ4 LG CAP W/ND

## (undated) DEVICE — ENDOSCOPY PACK - LOWER: Brand: MEDLINE INDUSTRIES, INC.

## (undated) DEVICE — 3M™ RED DOT™ MONITORING ELECTRODE WITH FOAM TAPE AND STICKY GEL, 50/BAG, 20/CASE, 72/PLT 2570: Brand: RED DOT™

## (undated) DEVICE — SNARE CAPTIFLEX MICRO-OVL OLY

## (undated) DEVICE — FLUIDGARD® 160 ANTI-FOG SURGICAL MASK WITH ANTI-GLARE SHIELD: Brand: PRECEPT ®

## (undated) NOTE — LETTER
Printed: 10/31/2017    Patient Name: Kavita Alves  : 1940   Medical Record #: [id number]    Consent to Chemotherapy    I, Kavita Alves, understand that I have been diagnosed with Metastatic small cell of lung     I understand that the treatment eubanks

## (undated) NOTE — MR AVS SNAPSHOT
After Visit Summary   5/10/2017    Katharine Segal    MRN: [id number]           Allergies     Crestor [Rosuvastatin]     Breathing problems with generic version      Your Vital Signs Were     BP Pulse Weight Smoking Status          158/77 mmHg 54 84.59 valuables/jewelry at home. Bring a photo ID and your insurance card to register. Please allow 2-3 hours from your appointment time until your exam is done.   After you are registered, you will have a blood glucose test.  If your blood glucose is within no

## (undated) NOTE — MR AVS SNAPSHOT
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 If you've been to the Emergency Department or your doctor's office, you can view your past visit information in Kash by going to Visits < Visit Summaries. Kash questions? Call [phone] for help.   Kash is NOT to be used for urge

## (undated) NOTE — Clinical Note
JUANI, TCM call made, see notes. NCM attempted to schedule TCM HFU appointment patient declined see notes.  Message sent to MD's office

## (undated) NOTE — Clinical Note
Printed: 2017    Patient Name: Kallie Cho  : 1940   Medical Record #: [id number]    Consent to Chemotherapy    I, Kallie Cho, understand that I have been diagnosed with prostate cancer / neuroendocrine carcinoma.     I understand that the Patient Signature                                                            Date      For patients requiring translation or verbal reading of this document, the person reading/translating should document and sign below:    [place]/ Signature

## (undated) NOTE — LETTER
[facility]  [address] [facility], [address] [place] [address]    Consent for Operation    Date: __________________    Time: _______________    1.  I authorize the performance upon Doc Jennifer the following operation:    * No surgery found * ***    2. I Fortino Rojas videotape. The [facility] will not be responsible for storage or maintenance of this tape. 6. For the purpose of advancing medical education, I consent to the admittance of observers to the Operating Room.     7. I authorize the use of any specimen, organs Signature of Patient:   ___________________________    When the patient is a minor or mentally incompetent to give consent:  Signature of person authorized to consent for patient: ___________________________   Relationship to patient: _____________________ drugs/illegal medications). Failure to inform my anesthesiologist about these medicines may increase my risk of anesthetic complications. · If I am allergic to anything or have had a reaction to anesthesia before.     3. I understand how the anesthesia med I have discussed the procedure and information above with the patient (or patient’s representative) and answered their questions. The patient or their representative has agreed to have anesthesia services.     _______________________________________________

## (undated) NOTE — MR AVS SNAPSHOT
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

## (undated) NOTE — MR AVS SNAPSHOT
[facility]  [address] [facility] [address]  [phone]               Thank you for choosing us for your health care visit with Ronny Valdovinos DO.   We are glad to serve you and happy to provide you with this summ Commonly known as:  VENTOLIN HFA           Atorvastatin Calcium 40 MG Tabs   Take 1 tablet (40 mg total) by mouth nightly. Commonly known as:  LIPITOR           azithromycin 250 MG Tabs   Take two tablets by mouth today, then one tablet daily.    Commonly Component Value Standard Range & Units    GLUCOSE (URINE DIPSTICK) neg Negative mg/dL    BILIRUBIN neg Negative    KETONES (URINE DIPSTICK) trace Negative mg/dL    SPECIFIC GRAVITY 1.020 1.005 - 1.030    OCCULT BLOOD moderate Negative    PH, URINE 5.5 4.

## (undated) NOTE — ED AVS SNAPSHOT
Vikas Moses   MRN: [id number]    Department:  [facility] Emergency Department   Date of Visit:  10/13/2017           Disclosure     Insurance plans vary and the physician(s) referred by the ER may not be covered by your plan.  Please contact your If you have been prescribed any medication(s), please fill your prescription right away and begin taking the medication(s) as directed    If the emergency physician has read X-rays, these will be re-interpreted by a radiologist.  If there is a significant

## (undated) NOTE — MR AVS SNAPSHOT
After Visit Summary   4/20/2017    Italo [place]    MRN: [id number]           Diagnoses this Visit     Unsteadiness    -  Primary     Pelvic pain         History of prostate cancer           Allergies     Crestor [Rosuvastatin]     Breathing problems Friday May 05, 2017     Imaging:  MRI SPINE LUMBAR (W+WO) ([id number])        Monday June 12, 2017 9:00 AM     Appointment with Caren Arizmendi at [facility], [address] [place] [address] ([phone])   [address] [place] [address]

## (undated) NOTE — MR AVS SNAPSHOT
After Visit Summary   6/15/2017    Katharine Segal    MRN: [id number]           Diagnoses this Visit     Prostate cancer metastatic to bone [facility])    -  Primary     Prostate cancer [facility])         Metastatic small cell carcinoma involving bone with unknow Cholecalciferol (VITAMIN D) 400 UNITS Oral Cap Take 400 Units by mouth daily. Aspirin (ECOTRIN) 325 MG Oral Tab EC Take 325 mg by mouth daily.                 Patient Instructions     None      Please Note     If a lab draw is part of your appointment view more details from this visit by going to [email or web address]. If you've recently had a stay at the Hospital you can access your discharge instructions in [employer] by going to Visits < Admission Summaries.  If you've been to the Emergency Depar

## (undated) NOTE — MR AVS SNAPSHOT
[address] [place] [address]  [phone]               Thank you for choosing us for your health care visit with Jackie Meier DO.   We are glad to serve you and happy to provide you with this summ FLOMAX 0.4 MG Caps   Generic drug:  tamsulosin HCl   Take 0.4 mg by mouth daily. ramipril 5 MG Caps   Take 5 mg by mouth daily. Commonly known as:  ALTACE           Vitamin D 400 units Caps   Take 400 Units by mouth daily.                    To

## (undated) NOTE — LETTER
[facility] [address]  [phone]        Date: 2/10/2018    TO: [facility]  Fax: [phone]  Care of: Pat Pearl    Patient Name: Dante Gibbons   : 1940

## (undated) NOTE — MR AVS SNAPSHOT
[facility]  [address] [facility] [address]  [phone]               Thank you for choosing us for your health care visit with Esperanza Dominique DO.   We are glad to serve you and happy to provide you with this summary of Atorvastatin Calcium 40 MG Tabs   Take 1 tablet (40 mg total) by mouth nightly. Commonly known as:  LIPITOR           azithromycin 250 MG Tabs   Take two tablets by mouth today, then one tablet daily.    Commonly known as:  Sonam vasquez You can access your MyChart to more actively manage your health care and view more details from this visit by going to [email or web address].   If you've recently had a stay at the Hospital you can access your discharge instructions in Fausto Dandy by donta You don’t need to join a gym. Home exercises work great.  Put more priority on exercise in your life                    Visit [facility] online at  [email or web address]

## (undated) NOTE — MR AVS SNAPSHOT
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 [facility])         Prostate cancer Appointment with Catrachita Anderson at [facility] in [place] [address]99)   [address]       Thursday Cherise 15, 2017 10:45 AM     Appointment with KLARISSA Day at [facility] in [place] ([phone]) Total Protein 7.0  6.1-8.3  g/dL Final    Albumin 3.1 (L) 3.5-4.8  g/dL Final    Sodium 138  136-144  mmol/L Final    Potassium 4.1  3.6-5.1  mmol/L Final    Chloride 108  101-111  mmol/L Final    CO2 21.0 (L) 22.0-32.0  mmol/L Final         Result Summar For medical emergencies, dial 911.

## (undated) NOTE — IP AVS SNAPSHOT
[facility] [place] [address] [place], [address] ~ [phone]                Discharge Summary   4/27/2017    Vira Robledo           Admission Information        Provider Department    4/27/2017 Tyree Alicea MD  4nw-A         T Albuterol Sulfate  (90 Base) MCG/ACT Aers   Commonly known as:  VENTOLIN HFA        Inhale 2 puffs into the lungs every 4 (four) hours as needed for Wheezing.     Derinda Starcher                           atorvastatin 40 MG Tabs   Last time this [address] [facility] [address]  [phone]        Future Appointments     May 22, 2017 12:00 PM   Lab with Shiva Dubon [address] [facility])    [address]  [address]   [phone] 0.1 -- (04/28/17)  7.32 (H) (04/28/17)  0.37 (L) (04/28/17)  0.26 (04/28/17)  0.00 (04/28/17)  0.01    (04/27/17)  73.2 (04/27/17)  9.7 (04/27/17)  12.3 (04/27/17)  3.6 (04/27/17)  0.6  (04/27/17)  4.93 (04/27/17)  0.65 (L) (04/27/17)  0.83 (H) (04/27/17) Medicaid plans. To get signed up and covered, please call [phone] and ask to get set up for an insurance coverage that is in-network with Ru[facility] Garcia.         MyChart     Visit Vaccsys  You can access your MyChart to more actively manage Use: Lower cholesterol, protect your heart   Most common side effects: Dizziness, constipation, abnormal liver function   What to report to your healthcare team:  Dizziness, muscle aches, constipation           Other Blood Thinners     Platelet Aggregatio Use:  Treatment of asthma, COPD/emphysema, cough, allergies   Most common side effects: Headache, nasal irritation, palpitations, increased heart rate, increased blood pressure, allergic reaction, yeast infection of the mouth/tongue   What to report to yo

## (undated) NOTE — MR AVS SNAPSHOT
After Visit Summary   5/4/2017    Rohith Edmonds    MRN: [id number]           Diagnoses this Visit     Malignant small cell cancer [facility])    -  Primary     Prostate cancer ([facility] [address]         Radicular pain of thoracic region           Allergies     Crestor Appointment with Nina Howard; RN -  [facility] in [place] 94 20 56)   [address]       Monday May 15, 2017 11:45 AM     Appointment with Nina Howard; RN - DR. JACOBS at CBC W/ DIFFERENTIAL[[id number]]          Abnormal            Final result                 Please view results for these tests on the individual orders.          Result Summary for CBC W/ DIFFERENTIAL      Component Results     Component Value Fla

## (undated) NOTE — LETTER
[facility]  [address] [facility], [address] [place] [address]    Consent for Operation    Date: _____JUNE 5, 2017_____________    Time: _______________    1.  I authorize the performance upon Sima Soler the following operation:    Procedure(s):  COLONOSCOPY procedure has been videotaped, the surgeon will obtain the original videotape. The hospital will not be responsible for storage or maintenance of this tape.     6. For the purpose of advancing medical education, I consent to the admittance of observers to t STATEMENTS REQUIRING INSERTION OR COMPLETION WERE FILLED IN.     Signature of Patient:   ___________________________    When the patient is a minor or mentally incompetent to give consent:  Signature of person authorized to consent for patient: ____________ drugs/illegal medications). Failure to inform my anesthesiologist about these medicines may increase my risk of anesthetic complications. · If I am allergic to anything or have had a reaction to anesthesia before.     3. I understand how the anesthesia med I have discussed the procedure and information above with the patient (or patient’s representative) and answered their questions. The patient or their representative has agreed to have anesthesia services.     _______________________________________________

## (undated) NOTE — MR AVS SNAPSHOT
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) [facility])         Metastatic small cell carcinoma involving bone with unkn [place] [address] [address]       Monday May 22, 2017 11:30 AM     Appointment with KLARISSA Cho at [facility] in [place] 60[address])   [address]       Monday June 12, 2017 9:00 AM     Appointment with Marie Aaron Sodium 138  136-144  mmol/L Final    Potassium 4.0  3.6-5.1  mmol/L Final    Chloride 105  101-111  mmol/L Final    CO2 24.0  22.0-32.0  mmol/L Final         Result Summary for LDH      Component Results     Component Value Flag Ref Range Units Status Neutrophil Absolute 11.65 (H) 1.30-6.70  x10(3) uL Final    Lymphocyte Absolute 0.26 (L) 0.90-4.00  x10(3) uL Final    Monocyte Absolute 1.10 (H) 0.10-0.60  x10(3) uL Final    Eosinophil Absolute 0.00  0.00-0.30  x10(3) uL Final    Basophil Absolute 0.01

## (undated) NOTE — MR AVS SNAPSHOT
After Visit Summary   6/16/2017    Lon Gore    MRN: [id number]           Diagnoses this Visit     Metastatic small cell carcinoma involving bone with unknown primary site [facility])    -  Primary     Prostate cancer ([facility] 75.)         Secondary neuroendoc discharge instructions in Rambushart by going to Visits < Admission Summaries. If you've been to the Emergency Department or your doctor's office, you can view your past visit information in Rambushart by going to Visits < Visit Summaries. [employer] questions?

## (undated) NOTE — MR AVS SNAPSHOT
[facility]  [address] [facility] [address] [phone]               Thank you for choosing us for your health care visit with Leon Pennington DO.   We are glad to serve you and happy to provide you with this summ Today's Vital Signs     BP Pulse Temp Height Weight BMI    110/64 mmHg 69 99.3 °F (37.4 °C) (Tympanic) 68\" 203 lb 4 oz 30.91 kg/m2         Current Medications          This list is accurate as of: 3/1/17  1:05 PM.  Always use your most recent

## (undated) NOTE — MR AVS SNAPSHOT
[facility]  [address] [facility] [address]  [phone]               Thank you for choosing us for your health care visit with Mikey Aase, DO.   We are glad to serve you and happy to provide you with this summ Take 325 mg by mouth daily. ramipril 5 MG Caps   Take 5 mg by mouth daily. Commonly known as:  ALTACE           tamsulosin HCl 0.4 MG Caps   Take 1 capsule (0.4 mg total) by mouth nightly.    Commonly known as:  FLOMAX           Vitamin D 400 UN Trover questions? Call [phone] for help. Trover is NOT to be used for urgent needs. For medical emergencies, dial 911.            Visit [facility] online at  [email or web address]

## (undated) NOTE — IP AVS SNAPSHOT
After Visit Summary   5/15/2017    Katelin Melvin    MRN: [id number]           Allergies     Crestor [Rosuvastatin]     Breathing problems with generic version      Your Vital Signs Were     Smoking Status                   Former Smoker discharge instructions in [employer] by going to Visits < Admission Summaries. If you've been to the Emergency Department or your doctor's office, you can view your past visit information in [employer] by going to Visits < Visit Summaries. [employer] questions?

## (undated) NOTE — MR AVS SNAPSHOT
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 If you've been to the Emergency Department or your doctor's office, you can view your past visit information in GCommercehart by going to Visits < Visit Summaries. M[employer] Bell questions?

## (undated) NOTE — MR AVS SNAPSHOT
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.   If you've recently had a stay at the [facility] [place] [facility]

## (undated) NOTE — MR AVS SNAPSHOT
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

## (undated) NOTE — ED AVS SNAPSHOT
Rafia Dee   MRN: [id number]    Department:  [facility] Emergency Department   Date of Visit:  11/24/2017           Disclosure     Insurance plans vary and the physician(s) referred by the ER may not be covered by your plan.  Please contact your tell this physician (or your personal doctor if your instructions are to return to your personal doctor) about any new or lasting problems. The primary care or specialist physician will see patients referred from the [facility] Emergency Department.  Dino Abraham

## (undated) NOTE — LETTER
Printed: 2017    Patient Name: Katharine Segal  : 1940   Medical Record #: [id number]    Consent to Chemotherapy    I, Katharine Segal, understand that I have been diagnosed with small cell cancer.     I understand that the treatment suggested by dede

## (undated) NOTE — MR AVS SNAPSHOT
After Visit Summary   5/10/2017    Kallie Bynumchiquita    MRN: [id number]           Allergies     Crestor [Rosuvastatin]     Breathing problems with generic version      Your Vital Signs Were     Smoking Status                   Former Smoker valuables/jewelry at home. Bring a photo ID and your insurance card to register. Please allow 2-3 hours from your appointment time until your exam is done.   After you are registered, you will have a blood glucose test.  If your blood glucose is within no

## (undated) NOTE — LETTER
July 11, 2017    [facility] [address]  [address]    Dear Carlito Lawrence:  It was a pleasure speaking with you over the phone recently.  To follow up, I wanted to send you some contact information to utilize when you have a question and or

## (undated) NOTE — MR AVS SNAPSHOT
[facility]  [address] [facility] [address]  [phone]               Thank you for choosing us for your health care visit with Anabell Herrmann DO.   We are glad to serve you and happy to provide you with this summ Other           Medical Issues Discussed Today     AAA (abdominal aortic aneurysm)    Hyperlipidemia    Prostate cancer    Pulmonary emphysema    Encounter for annual health examination    -  Primary    Depression screening          Instructions and Infor service except at the [facility] Visit    Abdominal aortic aneurysm screening (once between ages 73-68)  No results found for this or any previous visit.  Limited to patients who meet one of the following criteria:   • Men who are 73-68 years old a Hepatitis B for Moderate/High Risk No orders found for this or any previous visit.  Medium/high risk factors:   End-stage renal disease   Hemophiliacs who received Factor VIII or IX concentrates   Clients of institutions for the mentally retarded   Persons Take 1 tablet (50 mg total) by mouth daily. Commonly known as:  CASODEX           carvedilol 6.25 MG Tabs   Take 6.25 mg by mouth 2 (two) times daily with meals.    Commonly known as:  COREG           Cholestyramine 4 g Pack   Take 1 packet by mouth 3 (th These medications were sent to [address] [place] [address], [phone]  [address] [place] [address], [place]     Phone:  [phone]    - diphenoxylate-atropine 2.5-0.025 MG Tabs  - nystatin 115433 UNIT/ML S

## (undated) NOTE — IP AVS SNAPSHOT
[facility] [place] [address] [place], [address] ~ [phone]                Discharge Summary   6/8/2017    Kallie Cho           Admission Information        Provider Department    6/8/2017 Tata Martinez DO  4nw-A         Thank Clopidogrel Bisulfate 75 MG Tabs   Last time this was given:  75 mg on 6/11/2017  8:38 AM   Commonly known as:  PLAVIX        Take 75 mg by mouth daily.                             dexamethasone 4 MG tablet   Last time this was given:  2 mg on 6/11/2017  8 Albuterol Sulfate  (90 Base) MCG/ACT Aers   Commonly known as:  VENTOLIN HFA           docusate sodium 100 MG Caps   Commonly known as:  COLACE           metRONIDAZOLE 500 MG Tabs   Commonly known as:  FLAGYL           nystatin [id number] UNIT/ML [place], [address]    [address] [facility] [address]: [phone]    Ruiz Brown 30:  [address] [address] in [facility] in [address] [place] [address], [address] 0.25 (L) (05/22/17)  0.99 (H) (05/22/17)  0.04 (05/22/17)  0.02    (05/11/17)  87.9 (05/11/17)  2.0 (05/11/17)  8.3 (05/11/17)  0.0 (05/11/17)  0.1  (05/11/17)  11.65 (H) (05/11/17)  0.26 (L) (05/11/17)  1.10 (H) (05/11/17)  0.00 (05/11/17)  0.01    (05/04 can help with your Affordable Care Act coverage, as well as all types of Medicaid plans. To get signed up and covered, please call [phone] and ask to get set up for an insurance coverage that is in-network with Ruiz Carrizales Cholesterol Lowering Medications     Cholestyramine 4 g Oral Powd Pack    Atorvastatin Calcium (LIPITOR) 40 MG Oral Tab       Use: Lower cholesterol, protect your heart   Most common side effects: Dizziness, constipation, abnormal liver function   What to What to report to your healthcare team: Pain, nausea/vomiting, no bowel movement in 2+ days, diarrhea           Steroids     dexamethasone (DECADRON) 4 MG tablet         Use:  To treat inflammation, to prevent or treat allergic reactions, chemotherapy relat